# Patient Record
Sex: FEMALE | Race: WHITE | Employment: OTHER | URBAN - METROPOLITAN AREA
[De-identification: names, ages, dates, MRNs, and addresses within clinical notes are randomized per-mention and may not be internally consistent; named-entity substitution may affect disease eponyms.]

---

## 2018-03-20 ENCOUNTER — HOSPITAL ENCOUNTER (INPATIENT)
Age: 53
LOS: 3 days | Discharge: HOME | DRG: 247 | End: 2018-03-23
Attending: EMERGENCY MEDICINE | Admitting: INTERNAL MEDICINE
Payer: MEDICARE

## 2018-03-20 ENCOUNTER — APPOINTMENT (OUTPATIENT)
Dept: GENERAL RADIOLOGY | Age: 53
DRG: 247 | End: 2018-03-20
Attending: EMERGENCY MEDICINE
Payer: MEDICARE

## 2018-03-20 DIAGNOSIS — I48.91 ATRIAL FIBRILLATION WITH RVR (HCC): ICD-10-CM

## 2018-03-20 DIAGNOSIS — I21.4 NSTEMI (NON-ST ELEVATED MYOCARDIAL INFARCTION) (HCC): Primary | ICD-10-CM

## 2018-03-20 DIAGNOSIS — I42.1 HYPERTROPHIC OBSTRUCTIVE CARDIOMYOPATHY (HCC): ICD-10-CM

## 2018-03-20 PROBLEM — R07.9 CHEST PAIN AT REST: Status: ACTIVE | Noted: 2018-03-20

## 2018-03-20 PROBLEM — R77.8 ELEVATED TROPONIN: Status: ACTIVE | Noted: 2018-03-20

## 2018-03-20 PROBLEM — Z82.49 FAMILY HISTORY OF HYPERTROPHIC CARDIOMYOPATHY: Status: ACTIVE | Noted: 2018-03-20

## 2018-03-20 LAB
ALBUMIN SERPL-MCNC: 3.1 G/DL (ref 3.5–5)
ALBUMIN/GLOB SERPL: 0.7 {RATIO} (ref 1.2–3.5)
ALP SERPL-CCNC: 111 U/L (ref 50–136)
ALT SERPL-CCNC: 47 U/L (ref 12–65)
ANION GAP SERPL CALC-SCNC: 10 MMOL/L (ref 7–16)
APTT PPP: 26.3 SEC (ref 23.2–35.3)
AST SERPL-CCNC: 58 U/L (ref 15–37)
BASOPHILS # BLD: 0 K/UL (ref 0–0.2)
BASOPHILS NFR BLD: 0 % (ref 0–2)
BILIRUB SERPL-MCNC: 0.6 MG/DL (ref 0.2–1.1)
BNP SERPL-MCNC: 1085 PG/ML
BUN SERPL-MCNC: 19 MG/DL (ref 6–23)
CALCIUM SERPL-MCNC: 8.5 MG/DL (ref 8.3–10.4)
CHLORIDE SERPL-SCNC: 103 MMOL/L (ref 98–107)
CO2 SERPL-SCNC: 26 MMOL/L (ref 21–32)
CREAT SERPL-MCNC: 0.7 MG/DL (ref 0.6–1)
DIFFERENTIAL METHOD BLD: ABNORMAL
EOSINOPHIL # BLD: 0.4 K/UL (ref 0–0.8)
EOSINOPHIL NFR BLD: 3 % (ref 0.5–7.8)
ERYTHROCYTE [DISTWIDTH] IN BLOOD BY AUTOMATED COUNT: 14.9 % (ref 11.9–14.6)
GLOBULIN SER CALC-MCNC: 4.3 G/DL (ref 2.3–3.5)
GLUCOSE SERPL-MCNC: 112 MG/DL (ref 65–100)
HCT VFR BLD AUTO: 42.1 % (ref 35.8–46.3)
HGB BLD-MCNC: 13.4 G/DL (ref 11.7–15.4)
IMM GRANULOCYTES # BLD: 0 K/UL (ref 0–0.5)
IMM GRANULOCYTES NFR BLD AUTO: 0 % (ref 0–5)
LYMPHOCYTES # BLD: 2.6 K/UL (ref 0.5–4.6)
LYMPHOCYTES NFR BLD: 22 % (ref 13–44)
MCH RBC QN AUTO: 25.5 PG (ref 26.1–32.9)
MCHC RBC AUTO-ENTMCNC: 31.8 G/DL (ref 31.4–35)
MCV RBC AUTO: 80.2 FL (ref 79.6–97.8)
MONOCYTES # BLD: 0.9 K/UL (ref 0.1–1.3)
MONOCYTES NFR BLD: 8 % (ref 4–12)
NEUTS SEG # BLD: 7.8 K/UL (ref 1.7–8.2)
NEUTS SEG NFR BLD: 67 % (ref 43–78)
PLATELET # BLD AUTO: 316 K/UL (ref 150–450)
PMV BLD AUTO: 10.6 FL (ref 10.8–14.1)
POTASSIUM SERPL-SCNC: 3.3 MMOL/L (ref 3.5–5.1)
PROT SERPL-MCNC: 7.4 G/DL (ref 6.3–8.2)
RBC # BLD AUTO: 5.25 M/UL (ref 4.05–5.25)
SODIUM SERPL-SCNC: 139 MMOL/L (ref 136–145)
TROPONIN I SERPL-MCNC: 20.7 NG/ML (ref 0.02–0.05)
WBC # BLD AUTO: 11.8 K/UL (ref 4.3–11.1)

## 2018-03-20 PROCEDURE — 74011250636 HC RX REV CODE- 250/636: Performed by: INTERNAL MEDICINE

## 2018-03-20 PROCEDURE — 74011000250 HC RX REV CODE- 250: Performed by: INTERNAL MEDICINE

## 2018-03-20 PROCEDURE — 74011250636 HC RX REV CODE- 250/636: Performed by: EMERGENCY MEDICINE

## 2018-03-20 PROCEDURE — 99285 EMERGENCY DEPT VISIT HI MDM: CPT | Performed by: EMERGENCY MEDICINE

## 2018-03-20 PROCEDURE — 74011000258 HC RX REV CODE- 258: Performed by: EMERGENCY MEDICINE

## 2018-03-20 PROCEDURE — 93005 ELECTROCARDIOGRAM TRACING: CPT | Performed by: EMERGENCY MEDICINE

## 2018-03-20 PROCEDURE — 65660000000 HC RM CCU STEPDOWN

## 2018-03-20 PROCEDURE — 96375 TX/PRO/DX INJ NEW DRUG ADDON: CPT | Performed by: EMERGENCY MEDICINE

## 2018-03-20 PROCEDURE — 74011250637 HC RX REV CODE- 250/637: Performed by: NURSE PRACTITIONER

## 2018-03-20 PROCEDURE — 96374 THER/PROPH/DIAG INJ IV PUSH: CPT | Performed by: EMERGENCY MEDICINE

## 2018-03-20 PROCEDURE — 74011000250 HC RX REV CODE- 250: Performed by: EMERGENCY MEDICINE

## 2018-03-20 PROCEDURE — 84484 ASSAY OF TROPONIN QUANT: CPT | Performed by: EMERGENCY MEDICINE

## 2018-03-20 PROCEDURE — 96365 THER/PROPH/DIAG IV INF INIT: CPT | Performed by: EMERGENCY MEDICINE

## 2018-03-20 PROCEDURE — 74011250637 HC RX REV CODE- 250/637: Performed by: EMERGENCY MEDICINE

## 2018-03-20 PROCEDURE — 85025 COMPLETE CBC W/AUTO DIFF WBC: CPT | Performed by: EMERGENCY MEDICINE

## 2018-03-20 PROCEDURE — 80053 COMPREHEN METABOLIC PANEL: CPT | Performed by: EMERGENCY MEDICINE

## 2018-03-20 PROCEDURE — 85730 THROMBOPLASTIN TIME PARTIAL: CPT | Performed by: INTERNAL MEDICINE

## 2018-03-20 PROCEDURE — 83880 ASSAY OF NATRIURETIC PEPTIDE: CPT | Performed by: EMERGENCY MEDICINE

## 2018-03-20 PROCEDURE — 71045 X-RAY EXAM CHEST 1 VIEW: CPT

## 2018-03-20 RX ORDER — SODIUM CHLORIDE 0.9 % (FLUSH) 0.9 %
5-10 SYRINGE (ML) INJECTION AS NEEDED
Status: DISCONTINUED | OUTPATIENT
Start: 2018-03-20 | End: 2018-03-21

## 2018-03-20 RX ORDER — SODIUM CHLORIDE 0.9 % (FLUSH) 0.9 %
5-10 SYRINGE (ML) INJECTION EVERY 8 HOURS
Status: DISCONTINUED | OUTPATIENT
Start: 2018-03-20 | End: 2018-03-21

## 2018-03-20 RX ORDER — DILTIAZEM HYDROCHLORIDE 120 MG/1
120 CAPSULE, COATED, EXTENDED RELEASE ORAL
Status: COMPLETED | OUTPATIENT
Start: 2018-03-20 | End: 2018-03-20

## 2018-03-20 RX ORDER — DILTIAZEM HYDROCHLORIDE 5 MG/ML
10 INJECTION INTRAVENOUS ONCE
Status: COMPLETED | OUTPATIENT
Start: 2018-03-20 | End: 2018-03-20

## 2018-03-20 RX ORDER — POTASSIUM CHLORIDE 20 MEQ/1
40 TABLET, EXTENDED RELEASE ORAL
Status: COMPLETED | OUTPATIENT
Start: 2018-03-20 | End: 2018-03-20

## 2018-03-20 RX ORDER — HEPARIN SODIUM 5000 [USP'U]/ML
4000 INJECTION, SOLUTION INTRAVENOUS; SUBCUTANEOUS
Status: COMPLETED | OUTPATIENT
Start: 2018-03-20 | End: 2018-03-20

## 2018-03-20 RX ORDER — GUAIFENESIN 100 MG/5ML
162 LIQUID (ML) ORAL
Status: COMPLETED | OUTPATIENT
Start: 2018-03-20 | End: 2018-03-20

## 2018-03-20 RX ORDER — ONDANSETRON 2 MG/ML
4 INJECTION INTRAMUSCULAR; INTRAVENOUS
Status: COMPLETED | OUTPATIENT
Start: 2018-03-20 | End: 2018-03-20

## 2018-03-20 RX ORDER — HEPARIN SODIUM 5000 [USP'U]/100ML
12-25 INJECTION, SOLUTION INTRAVENOUS
Status: DISCONTINUED | OUTPATIENT
Start: 2018-03-20 | End: 2018-03-21

## 2018-03-20 RX ORDER — DILTIAZEM HYDROCHLORIDE 5 MG/ML
20 INJECTION INTRAVENOUS
Status: COMPLETED | OUTPATIENT
Start: 2018-03-20 | End: 2018-03-20

## 2018-03-20 RX ORDER — MORPHINE SULFATE 2 MG/ML
4 INJECTION, SOLUTION INTRAMUSCULAR; INTRAVENOUS
Status: COMPLETED | OUTPATIENT
Start: 2018-03-20 | End: 2018-03-20

## 2018-03-20 RX ORDER — DILTIAZEM HYDROCHLORIDE 5 MG/ML
15 INJECTION INTRAVENOUS
Status: DISCONTINUED | OUTPATIENT
Start: 2018-03-20 | End: 2018-03-20

## 2018-03-20 RX ADMIN — HEPARIN SODIUM 4000 UNITS: 5000 INJECTION, SOLUTION INTRAVENOUS; SUBCUTANEOUS at 21:15

## 2018-03-20 RX ADMIN — MORPHINE SULFATE 4 MG: 2 INJECTION, SOLUTION INTRAMUSCULAR; INTRAVENOUS at 22:09

## 2018-03-20 RX ADMIN — DILTIAZEM HYDROCHLORIDE 120 MG: 120 CAPSULE, COATED, EXTENDED RELEASE ORAL at 20:36

## 2018-03-20 RX ADMIN — DILTIAZEM HYDROCHLORIDE 20 MG: 5 INJECTION INTRAVENOUS at 20:31

## 2018-03-20 RX ADMIN — Medication 5 ML: at 22:39

## 2018-03-20 RX ADMIN — ASPIRIN 81 MG 162 MG: 81 TABLET ORAL at 20:24

## 2018-03-20 RX ADMIN — ASPIRIN 81 MG 162 MG: 81 TABLET ORAL at 21:03

## 2018-03-20 RX ADMIN — SODIUM CHLORIDE 1000 ML: 900 INJECTION, SOLUTION INTRAVENOUS at 21:03

## 2018-03-20 RX ADMIN — SODIUM CHLORIDE 10 MG/HR: 900 INJECTION, SOLUTION INTRAVENOUS at 20:59

## 2018-03-20 RX ADMIN — ONDANSETRON 4 MG: 2 INJECTION INTRAMUSCULAR; INTRAVENOUS at 22:09

## 2018-03-20 RX ADMIN — POTASSIUM CHLORIDE 40 MEQ: 20 TABLET, EXTENDED RELEASE ORAL at 21:53

## 2018-03-20 RX ADMIN — DILTIAZEM HYDROCHLORIDE 10 MG: 5 INJECTION INTRAVENOUS at 20:57

## 2018-03-20 RX ADMIN — HEPARIN SODIUM AND DEXTROSE 12 UNITS/KG/HR: 5000; 5 INJECTION INTRAVENOUS at 22:36

## 2018-03-20 NOTE — ED TRIAGE NOTES
Life style changes and living arrangement over last 1.5 yrs. Admits to not taking diuretic for at least 4 days. Currently having shortness of breath that is worse with exertion. States \"not a whole lot of swelling in extremities but swelling in face and not able to lie down.

## 2018-03-20 NOTE — IP AVS SNAPSHOT
[address] 
[phone] Patient: Amanda Quinones MRN: [id number] :1965 A check miranda indicates which time of day the medication should be taken. My Medications START taking these medications Instructions Each Dose to Equal  
 Morning Noon Evening Bedtime  
 albuterol 90 mcg/actuation inhaler Commonly known as:  PROVENTIL HFA, VENTOLIN HFA, PROAIR HFA Notes to Patient:  As needed Take 2 Puffs by inhalation every six (6) hours as needed for Wheezing. 2 Puff  
    
   
   
   
  
 atorvastatin 80 mg tablet Commonly known as:  LIPITOR Your next dose is:  3-23-18 Take 1 Tab by mouth nightly. 80 mg  
    
   
   
  
   
  
 busPIRone 10 mg tablet Commonly known as:  BUSPAR Your next dose is:  3-23-18 Take 1 Tab by mouth three (3) times daily (with meals). 10 mg  
    
  
   
  
   
  
   
  
 clopidogrel 75 mg Tab Commonly known as:  PLAVIX Your next dose is:  3-24-18 Take 1 Tab by mouth daily. 75 mg  
    
  
   
   
   
  
 metoprolol succinate 50 mg XL tablet Commonly known as:  TOPROL-XL Your next dose is:  3-24-18 Take 1 Tab by mouth daily. 50 mg  
    
  
   
   
   
  
 nitroglycerin 0.4 mg SL tablet Commonly known as:  NITROSTAT Notes to Patient:  As needed for chest pain 1 Tab by SubLINGual route every five (5) minutes as needed for Chest Pain. 0.4 mg  
    
   
   
   
  
 rivaroxaban 15 mg Tab tablet Commonly known as:  Erla Brothers Your next dose is:  3-23-18 Take 1 Tab by mouth daily (with dinner). 15 mg  
    
   
   
  
   
  
  
STOP taking these medications LASIX 20 mg tablet Generic drug:  furosemide Where to Get Your Medications These medications were sent to [facility], [address] [place] [address] [place] [address] Phone:  [phone]  
  albuterol 90 mcg/actuation inhaler  
 atorvastatin 80 mg tablet  
 busPIRone 10 mg tablet  
 clopidogrel 75 mg Tab  
 metoprolol succinate 50 mg XL tablet  
 nitroglycerin 0.4 mg SL tablet  
 rivaroxaban 15 mg Tab tablet

## 2018-03-20 NOTE — IP AVS SNAPSHOT
[address] [facility] 
 
 
 [address] 
[phone] Patient: Ashley Duncan MRN: [id number] :1965 About your hospitalization You were admitted on:  2018 You last received care in the:  [facility] 3 TELEMETRY You were discharged on:  2018 Why you were hospitalized Your primary diagnosis was:  Nstemi (Non-St Elevated Myocardial Infarction) (Hcc) Your diagnoses also included:  Hypertrophic Obstructive Cardiomyopathy (Hcc), Copd (Chronic Obstructive Pulmonary Disease) (Hcc), Tobacco Use Disorder, Atrial Fibrillation With Rapid Ventricular Response (Hcc), Coronary Artery Disease Involving Native Coronary Artery Of Native Heart, Essential Hypertension, Dyslipidemia Follow-up Information Follow up With Details Comments Contact Info O CARDIOPULM REHAB  Call if you would like to schedule Orientation for Cardiac Rehab. [phone] 
[phone] Red Ramos MD   [address] [facility] [address] 
[phone] Trinidad Mccauley MD   at [address] [facility] [address]  [address] [facility] [address] 
[phone] Your Scheduled Appointments   4:30 PM EDT TRANSITIONAL CARE with Trinidad Mccauley MD  
[facility] ([address] [place] [address]) [address]  
[address] [facility] 81  
[phone] Discharge Orders Procedure Order Date Status Priority Quantity Spec Type Associated Dx REFERRAL TO CARDIAC [facility] ([facility] 18 0856 Normal Routine 1 A check miranda indicates which time of day the medication should be taken. My Medications START taking these medications Instructions Each Dose to Equal  
 Morning Noon Evening Bedtime  
 albuterol 90 mcg/actuation inhaler Commonly known as:  PROVENTIL HFA, VENTOLIN HFA, PROAIR HFA  
 Notes to Patient:  As needed Take 2 Puffs by inhalation every six (6) hours as needed for Wheezing. 2 Puff  
    
   
   
   
  
 atorvastatin 80 mg tablet Commonly known as:  LIPITOR Your next dose is:  3-23-18 Take 1 Tab by mouth nightly. 80 mg  
    
   
   
  
   
  
 busPIRone 10 mg tablet Commonly known as:  BUSPAR Your next dose is:  3-23-18 Take 1 Tab by mouth three (3) times daily (with meals). 10 mg  
    
  
   
  
   
  
   
  
 clopidogrel 75 mg Tab Commonly known as:  PLAVIX Your next dose is:  3-24-18 Take 1 Tab by mouth daily. 75 mg  
    
  
   
   
   
  
 metoprolol succinate 50 mg XL tablet Commonly known as:  TOPROL-XL Your next dose is:  3-24-18 Take 1 Tab by mouth daily. 50 mg  
    
  
   
   
   
  
 nitroglycerin 0.4 mg SL tablet Commonly known as:  NITROSTAT Notes to Patient:  As needed for chest pain 1 Tab by SubLINGual route every five (5) minutes as needed for Chest Pain. 0.4 mg  
    
   
   
   
  
 rivaroxaban 15 mg Tab tablet Commonly known as:  Thanh Tiffany Your next dose is:  3-23-18 Take 1 Tab by mouth daily (with dinner). 15 mg  
    
   
   
  
   
  
  
STOP taking these medications LASIX 20 mg tablet Generic drug:  furosemide Where to Get Your Medications These medications were sent to [address] [place] [address] Phone:  [phone]  
  albuterol 90 mcg/actuation inhaler  
 atorvastatin 80 mg tablet  
 busPIRone 10 mg tablet  
 clopidogrel 75 mg Tab  
 metoprolol succinate 50 mg XL tablet  
 nitroglycerin 0.4 mg SL tablet  
 rivaroxaban 15 mg Tab tablet Discharge Instructions Cardiac Catheterization/Angiography Discharge Instructions *Check the puncture site frequently for swelling or bleeding.  If you see any bleeding, lie down and apply pressure over the area with a clean town or washcloth. Notify your doctor for any redness, swelling, drainage or oozing from the puncture site. Notify your doctor for any fever or chills. *If the leg or arm with the puncture becomes cold, numb or painful, call [facility] Cardiology at  [phone] *Activity should be limited for the next 48 hours. Climb stairs as little as possible and avoid any stooping, bending or strenuous activity for 48 hours. No heavy lifting (anything over 10 pounds) for three days. *Do not drive for 48 hours. *You may resume your usual diet. Drink more fluids than usual. 
 
*Have a responsible person drive you home and stay with you for at least 24 hours after your heart catheterization/angiography. *You may remove the bandage from your Right and Arm in 24 hours. You may shower in 24 hours. No tub baths, hot tubs or swimming for one week. Do not place any lotions, creams, powders, ointments over the puncture site for one week. You may place a clean band-aid over the puncture site each day for 5 days. Change this daily. Percutaneous Coronary Intervention: What to Expect at [facility] Your Recovery Percutaneous coronary intervention (PCI) is the name for procedures that are used to open a narrowed or blocked coronary artery. The two most common PCI procedures are coronary angioplasty and coronary stent placement. Your groin or arm may have a bruise and feel sore for a day or two after a percutaneous coronary intervention (PCI). You can do light activities around the house, but nothing strenuous for several days. This care sheet gives you a general idea about how long it will take for you to recover. But each person recovers at a different pace. Follow the steps below to get better as quickly as possible. How can you care for yourself at home? Activity ? · If the doctor gave you a sedative: ¨ For 24 hours, don't do anything that requires attention to detail. It takes time for the medicine's effects to completely wear off. ¨ For your safety, do not drive or operate any machinery that could be dangerous. Wait until the medicine wears off and you can think clearly and react easily. ? · Do not do strenuous exercise and do not lift, pull, or push anything heavy until your doctor says it is okay. This may be for a day or two. You can walk around the house and do light activity, such as cooking. ? · If the catheter was placed in your groin, try not to walk up stairs for the first couple of days. ? · If the catheter was placed in your arm near your wrist, do not bend your wrist deeply for the first couple of days. Be careful using your hand to get into and out of a chair or bed. ? · Carry your stent identification card with you at all times. ? · If your doctor recommends it, get more exercise. Walking is a good choice. Bit by bit, increase the amount you walk every day. Try for at least 30 minutes on most days of the week. Diet ? · Drink plenty of fluids to help your body flush out the dye. If you have kidney, heart, or liver disease and have to limit fluids, talk with your doctor before you increase the amount of fluids you drink. ? · Keep eating a heart-healthy diet that has lots of fruits, vegetables, and whole grains. If you have not been eating this way, talk to your doctor. You also may want to talk to a dietitian. This expert can help you to learn about healthy foods and plan meals. Medicines ? · Your doctor will tell you if and when you can restart your medicines. He or she will also give you instructions about taking any new medicines. ? · If you take blood thinners, such as warfarin (Coumadin), clopidogrel (Plavix), or aspirin, be sure to talk to your doctor. He or she will tell you if and when to start taking those medicines again.  Make sure that you understand exactly what your doctor wants you to do.  
? · Your doctor will prescribe blood-thinning medicines. You will likely take aspirin plus another antiplatelet, such as clopidogrel (Plavix). It is very important that you take these medicines exactly as directed. These medicines help keep the coronary artery open and reduce your risk of a heart attack. ? · Call your doctor if you think you are having a problem with your medicine. ?Care of the catheter site ? · For 1 or 2 days, keep a bandage over the spot where the catheter was inserted. The bandage probably will fall off in this time. ? · Put ice or a cold pack on the area for 10 to 20 minutes at a time to help with soreness or swelling. Put a thin cloth between the ice and your skin. ? · You may shower 24 to 48 hours after the procedure, if your doctor okays it. Pat the incision dry. ? · Do not soak the catheter site until it is healed. Don't take a bath for 1 week, or until your doctor tells you it isokay. Follow-up care is a key part of your treatment and safety. Be sure to make and go to all appointments, and call your doctor if you are having problems. It's also a good idea to know your test results and keep a list of the medicines you take. When should you call for help? Call 911 anytime you think you may need emergency care. For example, call if: 
? · You passed out (lost consciousness). ? · You have severe trouble breathing. ? · You have sudden chest pain and shortness of breath, or you cough up blood. ? · You have symptoms of a heart attack, such as: ¨ Chest pain or pressure. ¨ Sweating. ¨ Shortness of breath. ¨ Nausea or vomiting. ¨ Pain that spreads from the chest to the neck, jaw, or one or both shoulders or arms. ¨ Dizziness or lightheadedness. ¨ A fast or uneven pulse. After calling 911, chew 1 adult-strength aspirin. Wait for an ambulance. Do not try to drive yourself. ? · You have been diagnosed with angina, and you have angina symptoms that do not go away with rest or are not getting better within 5 minutes after you take one dose of nitroglycerin. ?Call your doctor now or seek immediate medical care if: 
? · You are bleeding from the area where the catheter was put in your artery. ? · You have a fast-growing, painful lump at the catheter site. ? · You have signs of infection, such as: 
¨ Increased pain, swelling, warmth, or redness. ¨ Red streaks leading from the catheter site. ¨ Pus draining from the catheter site. ¨ A fever. ? · Your leg or arm looks blue or feels cold, numb, or tingly. ? Watch closely for changes in your health, and be sure to contact your doctor if you have any problems. Where can you learn more? Go to http://juanita-mj.info/. Enter K328 in the search box to learn more about \"Percutaneous Coronary Intervention: What to Expect at Home. \" Current as of: September 21, 2016 Content Version: 11.4 © 0781-2491 [employer]. Care instructions adapted under license by [employer] (which disclaims liability or warranty for this information). If you have questions about a medical condition or this instruction, always ask your healthcare professional. [address] any warranty or liability for your use of this information. Zecter Announcement We are excited to announce that we are making your provider's discharge notes available to you in Zecter. You will see these notes when they are completed and signed by the physician that discharged you from your recent hospital stay. If you have any questions or concerns about any information you see in Zecter, please call the Health Information Department where you were seen or reach out to your Primary Care Provider for more information about your plan of care. Introducing [facility]! Loren Santiago introduces IVFXPERT patient portal. Now you can access parts of your medical record, email your doctor's office, and request medication refills online. 1. In your internet browser, go to [email or web address] Media 2. Click on the First Time User? Click Here link in the Sign In box. You will see the New Member Sign Up page. 3. Enter your IVFXPERT Access Code exactly as it appears below. You will not need to use this code after youve completed the sign-up process. If you do not sign up before the expiration date, you must request a new code. · IVFXPERT Access Code: [email or web address] Expires: 6/18/2018  6:08 PM 
 
4. Enter the last four digits of your Social Security Number (xxxx) and Date of Birth (mm/dd/yyyy) as indicated and click Submit. You will be taken to the next sign-up page. 5. Create a IVFXPERT ID. This will be your IVFXPERT login ID and cannot be changed, so think of one that is secure and easy to remember. 6. Create a IVFXPERT password. You can change your password at any time. 7. Enter your Password Reset Question and Answer. This can be used at a later time if you forget your password. 8. Enter your e-mail address. You will receive e-mail notification when new information is available in [address] 9. Click Sign Up. You can now view and download portions of your medical record. 10. Click the Download Summary menu link to download a portable copy of your medical information. If you have questions, please visit the Frequently Asked Questions section of the IVFXPERT website. Remember, IVFXPERT is NOT to be used for urgent needs. For medical emergencies, dial 911. Now available from your iPhone and Android! Providers Seen During Your Hospitalization Provider Specialty Primary office phone Todd Carranza MD Emergency Medicine [phone] Tesfaye Danielle MD Cardiology [phone] Your Primary Care Physician (PCP) Primary Care Physician Office Phone Office Fax [address] [facility], [address] [phone] You are allergic to the following Allergen Reactions Sulfa (Sulfonamide Antibiotics) Itching Recent Documentation Height Weight BMI OB Status Smoking Status 1.575 m 70.6 kg 28.48 kg/m2 Having regular periods Current Every Day Smoker Emergency Contacts Name Discharge Info Relation Home Work Mobile Angie Darnell  Other Relative [6] [phone] Patient Belongings The following personal items are in your possession at time of discharge: 
  Dental Appliances: Uppers, With patient  Visual Aid: Glasses, At bedside      Home Medications: None   Jewelry: Ring  Clothing: At bedside    Other Valuables: Cell Phone Discharge Instructions Attachments/References CARDIAC REHABILITATION (ENGLISH) Patient Handouts Cardiac Rehabilitation: Care Instructions Your Care Instructions Cardiac rehabilitation is a program for people who have a heart problem, such as a heart attack, heart failure, or a heart valve disease. The program includes exercise, lifestyle changes, education, and emotional support. Cardiac rehab can help you improve the quality of your life through better overall health. It can help you lose weight and feel better about yourself. On your cardiac rehab team, you may have your doctor, a nurse specialist, a dietitian, and a physical therapist. They will design your cardiac rehab program specifically for you. You will learn how to reduce your risk for heart problems, how to manage stress, and how to eat a heart-healthy diet. By the end of the program, you will be ready to maintain a healthier lifestyle on your own. Follow-up care is a key part of your treatment and safety. Be sure to make and go to all appointments, and call your doctor if you are having problems.  It's also a good idea to know your test results and keep a list of the medicines you take. How can you care for yourself at home? · Take your medicines exactly as prescribed. Call your doctor if you think you are having a problem with your medicine. You will get more details on the specific medicines your doctor prescribes. · Weigh yourself every day if your doctor tells you to. Watch for sudden weight gain. Weigh yourself on the same scale with the same amount of clothing at the same time of day. · Plan your meals so that you are eating heart-healthy foods. ¨ Eat a variety of foods daily. Fresh fruits and vegetables and whole-grains are good choices. ¨ Limit your fat intake, especially saturated and trans fat. ¨ Limit salt (sodium). ¨ Increase fiber in your diet. ¨ Limit alcohol. · Learn how to take your pulse so that you can track your heart rate during exercise. · Always check with your doctor before you begin a new exercise program. 
· Warm up before you exercise and cool down afterward for at least 15 minutes each. This will help your heart gradually prepare for and recover from exercise and avoid pushing your heart too hard. · Stop exercising if you have any unusual discomfort, such as chest pain. · Do not smoke. Smoking can make heart problems worse. If you need help quitting, talk to your doctor about stop-smoking programs and medicines. These can increase your chances of quitting for good. When should you call for help? Call 911 anytime you think you may need emergency care. For example, call if: 
? · You have severe trouble breathing. ? · You cough up pink, foamy mucus and you have trouble breathing. ? · You have symptoms of a heart attack. These may include: ¨ Chest pain or pressure, or a strange feeling in the chest. 
¨ Sweating. ¨ Shortness of breath. ¨ Nausea or vomiting. ¨ Pain, pressure, or a strange feeling in the back, neck, jaw, or upper belly or in one or both shoulders or arms. ¨ Lightheadedness or sudden weakness. ¨ A fast or irregular heartbeat. After you call 911, the  may tell you to chew 1 adult-strength or 2 to 4 low-dose aspirin. Wait for an ambulance. Do not try to drive yourself. ? · You have angina symptoms (such as chest pain or pressure) that do not go away with rest or are not getting better within 5 minutes after you take a dose of nitroglycerin. ? · You have symptoms of a stroke. These may include: 
¨ Sudden numbness, tingling, weakness, or loss of movement in your face, arm, or leg, especially on only one side of your body. ¨ Sudden vision changes. ¨ Sudden trouble speaking. ¨ Sudden confusion or trouble understanding simple statements. ¨ Sudden problems with walking or balance. ¨ A sudden, severe headache that is different from past headaches. ? · You passed out (lost consciousness). ?Call your doctor now or seek immediate medical care if: 
? · You have new or increased shortness of breath. ? · You are dizzy or lightheaded, or you feel like you may faint. ? · You gain weight suddenly, such as more than 2 to 3 pounds in a day or 5 pounds in a week. (Your doctor may suggest a different range of weight gain.) ? · You have increased swelling in your legs, ankles, or feet. ? Watch closely for changes in your health, and be sure to contact your doctor if you have any problems. Where can you learn more? Go to http://juanita-mj.info/. Enter D336 in the search box to learn more about \"Cardiac Rehabilitation: Care Instructions. \" Current as of: September 21, 2016 Content Version: 11.4 © 5717-9699 [employer]. Care instructions adapted under license by [employer] (which disclaims liability or warranty for this information). If you have questions about a medical condition or this instruction, always ask your healthcare professional. [address] any warranty or liability for your use of this information. Please provide this summary of care documentation to your next provider. Signatures-by signing, you are acknowledging that this After Visit Summary has been reviewed with you and you have received a copy. Patient Signature:  ____________________________________________________________ Date:  ____________________________________________________________  
  
Yoli Miguel Angel Provider Signature:  ____________________________________________________________ Date:  ____________________________________________________________

## 2018-03-21 PROBLEM — I25.10 CORONARY ARTERY DISEASE INVOLVING NATIVE CORONARY ARTERY OF NATIVE HEART: Chronic | Status: ACTIVE | Noted: 2018-03-21

## 2018-03-21 PROBLEM — I10 ESSENTIAL HYPERTENSION: Chronic | Status: ACTIVE | Noted: 2018-03-21

## 2018-03-21 PROBLEM — E78.5 DYSLIPIDEMIA: Chronic | Status: ACTIVE | Noted: 2018-03-21

## 2018-03-21 PROBLEM — I21.4 NSTEMI (NON-ST ELEVATED MYOCARDIAL INFARCTION) (HCC): Status: ACTIVE | Noted: 2018-03-21

## 2018-03-21 LAB
ACT BLD: 153 SECS (ref 70–128)
ACT BLD: 406 SECS (ref 70–128)
ANION GAP SERPL CALC-SCNC: 16 MMOL/L (ref 7–16)
APTT PPP: 41.1 SEC (ref 23.2–35.3)
ATRIAL RATE: 170 BPM
ATRIAL RATE: 50 BPM
BUN SERPL-MCNC: 19 MG/DL (ref 6–23)
CALCIUM SERPL-MCNC: 8.1 MG/DL (ref 8.3–10.4)
CALCULATED P AXIS, ECG09: 12 DEGREES
CALCULATED R AXIS, ECG10: -52 DEGREES
CALCULATED R AXIS, ECG10: 62 DEGREES
CALCULATED T AXIS, ECG11: 108 DEGREES
CALCULATED T AXIS, ECG11: 77 DEGREES
CHLORIDE SERPL-SCNC: 105 MMOL/L (ref 98–107)
CHOLEST SERPL-MCNC: 105 MG/DL
CO2 SERPL-SCNC: 18 MMOL/L (ref 21–32)
CREAT SERPL-MCNC: 0.91 MG/DL (ref 0.6–1)
DIAGNOSIS, 93000: NORMAL
DIAGNOSIS, 93000: NORMAL
ERYTHROCYTE [DISTWIDTH] IN BLOOD BY AUTOMATED COUNT: 15.3 % (ref 11.9–14.6)
GLUCOSE SERPL-MCNC: 150 MG/DL (ref 65–100)
HCT VFR BLD AUTO: 45 % (ref 35.8–46.3)
HDLC SERPL-MCNC: 20 MG/DL (ref 40–60)
HDLC SERPL: 5.3 {RATIO}
HGB BLD-MCNC: 13.7 G/DL (ref 11.7–15.4)
LDLC SERPL CALC-MCNC: 66.8 MG/DL
LIPID PROFILE,FLP: ABNORMAL
MAGNESIUM SERPL-MCNC: 1.7 MG/DL (ref 1.8–2.4)
MCH RBC QN AUTO: 25 PG (ref 26.1–32.9)
MCHC RBC AUTO-ENTMCNC: 30.4 G/DL (ref 31.4–35)
MCV RBC AUTO: 82.3 FL (ref 79.6–97.8)
P-R INTERVAL, ECG05: 174 MS
PLATELET # BLD AUTO: 330 K/UL (ref 150–450)
PMV BLD AUTO: 10.9 FL (ref 10.8–14.1)
POTASSIUM SERPL-SCNC: 4.1 MMOL/L (ref 3.5–5.1)
Q-T INTERVAL, ECG07: 340 MS
Q-T INTERVAL, ECG07: 504 MS
QRS DURATION, ECG06: 130 MS
QRS DURATION, ECG06: 134 MS
QTC CALCULATION (BEZET), ECG08: 459 MS
QTC CALCULATION (BEZET), ECG08: 554 MS
RBC # BLD AUTO: 5.47 M/UL (ref 4.05–5.25)
SODIUM SERPL-SCNC: 139 MMOL/L (ref 136–145)
TRIGL SERPL-MCNC: 91 MG/DL (ref 35–150)
TROPONIN I SERPL-MCNC: 17.8 NG/ML (ref 0.02–0.05)
TROPONIN I SERPL-MCNC: 18.1 NG/ML (ref 0.02–0.05)
VENTRICULAR RATE, ECG03: 160 BPM
VENTRICULAR RATE, ECG03: 50 BPM
VLDLC SERPL CALC-MCNC: 18.2 MG/DL (ref 6–23)
WBC # BLD AUTO: 11.3 K/UL (ref 4.3–11.1)

## 2018-03-21 PROCEDURE — 77030015766

## 2018-03-21 PROCEDURE — C1887 CATHETER, GUIDING: HCPCS

## 2018-03-21 PROCEDURE — 74011250637 HC RX REV CODE- 250/637: Performed by: NURSE PRACTITIONER

## 2018-03-21 PROCEDURE — 77030019569 HC BND COMPR RAD TERU -B

## 2018-03-21 PROCEDURE — 85347 COAGULATION TIME ACTIVATED: CPT

## 2018-03-21 PROCEDURE — 80048 BASIC METABOLIC PNL TOTAL CA: CPT | Performed by: NURSE PRACTITIONER

## 2018-03-21 PROCEDURE — 74011250636 HC RX REV CODE- 250/636

## 2018-03-21 PROCEDURE — 74011000258 HC RX REV CODE- 258: Performed by: NURSE PRACTITIONER

## 2018-03-21 PROCEDURE — 74011250637 HC RX REV CODE- 250/637: Performed by: INTERNAL MEDICINE

## 2018-03-21 PROCEDURE — C1894 INTRO/SHEATH, NON-LASER: HCPCS

## 2018-03-21 PROCEDURE — 74011636320 HC RX REV CODE- 636/320: Performed by: INTERNAL MEDICINE

## 2018-03-21 PROCEDURE — 36415 COLL VENOUS BLD VENIPUNCTURE: CPT | Performed by: NURSE PRACTITIONER

## 2018-03-21 PROCEDURE — 027034Z DILATION OF CORONARY ARTERY, ONE ARTERY WITH DRUG-ELUTING INTRALUMINAL DEVICE, PERCUTANEOUS APPROACH: ICD-10-PCS | Performed by: INTERNAL MEDICINE

## 2018-03-21 PROCEDURE — 99152 MOD SED SAME PHYS/QHP 5/>YRS: CPT

## 2018-03-21 PROCEDURE — 74011250636 HC RX REV CODE- 250/636: Performed by: INTERNAL MEDICINE

## 2018-03-21 PROCEDURE — B2111ZZ FLUOROSCOPY OF MULTIPLE CORONARY ARTERIES USING LOW OSMOLAR CONTRAST: ICD-10-PCS | Performed by: INTERNAL MEDICINE

## 2018-03-21 PROCEDURE — C1874 STENT, COATED/COV W/DEL SYS: HCPCS

## 2018-03-21 PROCEDURE — C1769 GUIDE WIRE: HCPCS

## 2018-03-21 PROCEDURE — 83735 ASSAY OF MAGNESIUM: CPT | Performed by: INTERNAL MEDICINE

## 2018-03-21 PROCEDURE — 65660000000 HC RM CCU STEPDOWN

## 2018-03-21 PROCEDURE — 92928 PRQ TCAT PLMT NTRAC ST 1 LES: CPT

## 2018-03-21 PROCEDURE — B2151ZZ FLUOROSCOPY OF LEFT HEART USING LOW OSMOLAR CONTRAST: ICD-10-PCS | Performed by: INTERNAL MEDICINE

## 2018-03-21 PROCEDURE — 74011000250 HC RX REV CODE- 250: Performed by: INTERNAL MEDICINE

## 2018-03-21 PROCEDURE — 74011000250 HC RX REV CODE- 250: Performed by: NURSE PRACTITIONER

## 2018-03-21 PROCEDURE — 85027 COMPLETE CBC AUTOMATED: CPT | Performed by: NURSE PRACTITIONER

## 2018-03-21 PROCEDURE — 74011250636 HC RX REV CODE- 250/636: Performed by: NURSE PRACTITIONER

## 2018-03-21 PROCEDURE — 84484 ASSAY OF TROPONIN QUANT: CPT | Performed by: NURSE PRACTITIONER

## 2018-03-21 PROCEDURE — 80307 DRUG TEST PRSMV CHEM ANLYZR: CPT | Performed by: NURSE PRACTITIONER

## 2018-03-21 PROCEDURE — C8929 TTE W OR WO FOL WCON,DOPPLER: HCPCS

## 2018-03-21 PROCEDURE — 99153 MOD SED SAME PHYS/QHP EA: CPT

## 2018-03-21 PROCEDURE — 93005 ELECTROCARDIOGRAM TRACING: CPT | Performed by: INTERNAL MEDICINE

## 2018-03-21 PROCEDURE — 85730 THROMBOPLASTIN TIME PARTIAL: CPT | Performed by: INTERNAL MEDICINE

## 2018-03-21 PROCEDURE — 77030004534 HC CATH ANGI DX INFN CARD -A

## 2018-03-21 PROCEDURE — 80061 LIPID PANEL: CPT | Performed by: NURSE PRACTITIONER

## 2018-03-21 PROCEDURE — 4A023N7 MEASUREMENT OF CARDIAC SAMPLING AND PRESSURE, LEFT HEART, PERCUTANEOUS APPROACH: ICD-10-PCS | Performed by: INTERNAL MEDICINE

## 2018-03-21 PROCEDURE — 93458 L HRT ARTERY/VENTRICLE ANGIO: CPT

## 2018-03-21 RX ORDER — SODIUM CHLORIDE 0.9 % (FLUSH) 0.9 %
5-10 SYRINGE (ML) INJECTION AS NEEDED
Status: DISCONTINUED | OUTPATIENT
Start: 2018-03-21 | End: 2018-03-23 | Stop reason: HOSPADM

## 2018-03-21 RX ORDER — ATORVASTATIN CALCIUM 40 MG/1
80 TABLET, FILM COATED ORAL
Status: DISCONTINUED | OUTPATIENT
Start: 2018-03-21 | End: 2018-03-23 | Stop reason: HOSPADM

## 2018-03-21 RX ORDER — MORPHINE SULFATE 2 MG/ML
2 INJECTION, SOLUTION INTRAMUSCULAR; INTRAVENOUS
Status: DISCONTINUED | OUTPATIENT
Start: 2018-03-21 | End: 2018-03-21

## 2018-03-21 RX ORDER — FUROSEMIDE 20 MG/1
20 TABLET ORAL DAILY
COMMUNITY
End: 2018-03-23

## 2018-03-21 RX ORDER — ACETAMINOPHEN 325 MG/1
650 TABLET ORAL
Status: DISCONTINUED | OUTPATIENT
Start: 2018-03-21 | End: 2018-03-21 | Stop reason: SDUPTHER

## 2018-03-21 RX ORDER — SOTALOL HYDROCHLORIDE 80 MG/1
120 TABLET ORAL EVERY 12 HOURS
Status: DISCONTINUED | OUTPATIENT
Start: 2018-03-21 | End: 2018-03-21

## 2018-03-21 RX ORDER — ONDANSETRON 2 MG/ML
4 INJECTION INTRAMUSCULAR; INTRAVENOUS
Status: DISCONTINUED | OUTPATIENT
Start: 2018-03-21 | End: 2018-03-23 | Stop reason: HOSPADM

## 2018-03-21 RX ORDER — SODIUM CHLORIDE 0.9 % (FLUSH) 0.9 %
5-10 SYRINGE (ML) INJECTION EVERY 8 HOURS
Status: DISCONTINUED | OUTPATIENT
Start: 2018-03-21 | End: 2018-03-21

## 2018-03-21 RX ORDER — MIDAZOLAM HYDROCHLORIDE 1 MG/ML
.5-2 INJECTION, SOLUTION INTRAMUSCULAR; INTRAVENOUS
Status: DISCONTINUED | OUTPATIENT
Start: 2018-03-21 | End: 2018-03-21

## 2018-03-21 RX ORDER — SODIUM CHLORIDE 0.9 % (FLUSH) 0.9 %
5-10 SYRINGE (ML) INJECTION EVERY 8 HOURS
Status: DISCONTINUED | OUTPATIENT
Start: 2018-03-21 | End: 2018-03-23 | Stop reason: HOSPADM

## 2018-03-21 RX ORDER — HEPARIN SODIUM 5000 [USP'U]/ML
35 INJECTION, SOLUTION INTRAVENOUS; SUBCUTANEOUS ONCE
Status: COMPLETED | OUTPATIENT
Start: 2018-03-21 | End: 2018-03-21

## 2018-03-21 RX ORDER — ACETAMINOPHEN 325 MG/1
650 TABLET ORAL
Status: DISCONTINUED | OUTPATIENT
Start: 2018-03-21 | End: 2018-03-23 | Stop reason: HOSPADM

## 2018-03-21 RX ORDER — SODIUM CHLORIDE 0.9 % (FLUSH) 0.9 %
5-10 SYRINGE (ML) INJECTION AS NEEDED
Status: DISCONTINUED | OUTPATIENT
Start: 2018-03-21 | End: 2018-03-21

## 2018-03-21 RX ORDER — HYDROCODONE BITARTRATE AND ACETAMINOPHEN 5; 325 MG/1; MG/1
1 TABLET ORAL
Status: DISCONTINUED | OUTPATIENT
Start: 2018-03-21 | End: 2018-03-21 | Stop reason: SDUPTHER

## 2018-03-21 RX ORDER — LORAZEPAM 1 MG/1
1 TABLET ORAL
Status: DISCONTINUED | OUTPATIENT
Start: 2018-03-21 | End: 2018-03-22

## 2018-03-21 RX ORDER — GUAIFENESIN 100 MG/5ML
81 LIQUID (ML) ORAL DAILY
Status: DISCONTINUED | OUTPATIENT
Start: 2018-03-21 | End: 2018-03-21 | Stop reason: SDUPTHER

## 2018-03-21 RX ORDER — LIDOCAINE HYDROCHLORIDE 20 MG/ML
2-10 INJECTION, SOLUTION INFILTRATION; PERINEURAL
Status: DISCONTINUED | OUTPATIENT
Start: 2018-03-21 | End: 2018-03-21

## 2018-03-21 RX ORDER — SOTALOL HYDROCHLORIDE 80 MG/1
120 TABLET ORAL EVERY 12 HOURS
Status: DISCONTINUED | OUTPATIENT
Start: 2018-03-21 | End: 2018-03-22

## 2018-03-21 RX ORDER — GUAIFENESIN 100 MG/5ML
81 LIQUID (ML) ORAL DAILY
Status: DISCONTINUED | OUTPATIENT
Start: 2018-03-22 | End: 2018-03-23

## 2018-03-21 RX ORDER — LORAZEPAM 0.5 MG/1
0.5 TABLET ORAL
Status: DISCONTINUED | OUTPATIENT
Start: 2018-03-21 | End: 2018-03-23 | Stop reason: HOSPADM

## 2018-03-21 RX ORDER — HYDROCODONE BITARTRATE AND ACETAMINOPHEN 5; 325 MG/1; MG/1
1 TABLET ORAL
Status: DISCONTINUED | OUTPATIENT
Start: 2018-03-21 | End: 2018-03-23 | Stop reason: HOSPADM

## 2018-03-21 RX ORDER — NITROGLYCERIN 0.4 MG/1
0.4 TABLET SUBLINGUAL
Status: DISCONTINUED | OUTPATIENT
Start: 2018-03-21 | End: 2018-03-21

## 2018-03-21 RX ORDER — HEPARIN SODIUM 10000 [USP'U]/ML
2000-7000 INJECTION, SOLUTION INTRAVENOUS; SUBCUTANEOUS
Status: DISCONTINUED | OUTPATIENT
Start: 2018-03-21 | End: 2018-03-21

## 2018-03-21 RX ORDER — MAGNESIUM SULFATE HEPTAHYDRATE 40 MG/ML
2 INJECTION, SOLUTION INTRAVENOUS ONCE
Status: COMPLETED | OUTPATIENT
Start: 2018-03-21 | End: 2018-03-21

## 2018-03-21 RX ORDER — SODIUM CHLORIDE 9 MG/ML
75 INJECTION, SOLUTION INTRAVENOUS CONTINUOUS
Status: DISCONTINUED | OUTPATIENT
Start: 2018-03-21 | End: 2018-03-21

## 2018-03-21 RX ORDER — MAG HYDROX/ALUMINUM HYD/SIMETH 200-200-20
30 SUSPENSION, ORAL (FINAL DOSE FORM) ORAL
Status: DISCONTINUED | OUTPATIENT
Start: 2018-03-21 | End: 2018-03-23 | Stop reason: HOSPADM

## 2018-03-21 RX ORDER — HEPARIN SODIUM 200 [USP'U]/100ML
3 INJECTION, SOLUTION INTRAVENOUS CONTINUOUS
Status: DISCONTINUED | OUTPATIENT
Start: 2018-03-21 | End: 2018-03-21

## 2018-03-21 RX ORDER — MORPHINE SULFATE 2 MG/ML
2 INJECTION, SOLUTION INTRAMUSCULAR; INTRAVENOUS
Status: DISCONTINUED | OUTPATIENT
Start: 2018-03-21 | End: 2018-03-22

## 2018-03-21 RX ORDER — FENTANYL CITRATE 50 UG/ML
25-75 INJECTION, SOLUTION INTRAMUSCULAR; INTRAVENOUS
Status: DISCONTINUED | OUTPATIENT
Start: 2018-03-21 | End: 2018-03-21

## 2018-03-21 RX ORDER — METOPROLOL TARTRATE 25 MG/1
25 TABLET, FILM COATED ORAL EVERY 12 HOURS
Status: DISCONTINUED | OUTPATIENT
Start: 2018-03-21 | End: 2018-03-21

## 2018-03-21 RX ORDER — NITROGLYCERIN 0.4 MG/1
0.4 TABLET SUBLINGUAL
Status: DISCONTINUED | OUTPATIENT
Start: 2018-03-21 | End: 2018-03-23 | Stop reason: HOSPADM

## 2018-03-21 RX ORDER — ENOXAPARIN SODIUM 100 MG/ML
70 INJECTION SUBCUTANEOUS EVERY 12 HOURS
Status: DISCONTINUED | OUTPATIENT
Start: 2018-03-21 | End: 2018-03-23

## 2018-03-21 RX ORDER — WARFARIN SODIUM 5 MG/1
5 TABLET ORAL EVERY EVENING
Status: DISCONTINUED | OUTPATIENT
Start: 2018-03-21 | End: 2018-03-23

## 2018-03-21 RX ADMIN — ACETAMINOPHEN 650 MG: 325 TABLET, FILM COATED ORAL at 03:00

## 2018-03-21 RX ADMIN — NITROGLYCERIN 1 INCH: 20 OINTMENT TOPICAL at 01:00

## 2018-03-21 RX ADMIN — METOPROLOL TARTRATE 25 MG: 25 TABLET, FILM COATED ORAL at 07:59

## 2018-03-21 RX ADMIN — TICAGRELOR 180 MG: 90 TABLET ORAL at 12:45

## 2018-03-21 RX ADMIN — METOPROLOL TARTRATE 25 MG: 25 TABLET, FILM COATED ORAL at 01:00

## 2018-03-21 RX ADMIN — SODIUM CHLORIDE 10 MG/HR: 900 INJECTION, SOLUTION INTRAVENOUS at 05:43

## 2018-03-21 RX ADMIN — MAGNESIUM SULFATE HEPTAHYDRATE 2 G: 40 INJECTION, SOLUTION INTRAVENOUS at 18:11

## 2018-03-21 RX ADMIN — ENOXAPARIN SODIUM 70 MG: 80 INJECTION SUBCUTANEOUS at 18:02

## 2018-03-21 RX ADMIN — HEPARIN SODIUM 3 UNITS/HR: 200 INJECTION, SOLUTION INTRAVENOUS at 12:22

## 2018-03-21 RX ADMIN — HEPARIN SODIUM 2350 UNITS: 5000 INJECTION, SOLUTION INTRAVENOUS; SUBCUTANEOUS at 07:45

## 2018-03-21 RX ADMIN — ONDANSETRON 4 MG: 2 INJECTION INTRAMUSCULAR; INTRAVENOUS at 07:43

## 2018-03-21 RX ADMIN — Medication 10 ML: at 01:00

## 2018-03-21 RX ADMIN — LORAZEPAM 1 MG: 1 TABLET ORAL at 18:01

## 2018-03-21 RX ADMIN — Medication 1 AMPULE: at 07:38

## 2018-03-21 RX ADMIN — SODIUM CHLORIDE 2.5 MG/HR: 900 INJECTION, SOLUTION INTRAVENOUS at 07:39

## 2018-03-21 RX ADMIN — SODIUM CHLORIDE 75 ML/HR: 900 INJECTION, SOLUTION INTRAVENOUS at 12:57

## 2018-03-21 RX ADMIN — NITROGLYCERIN 1 INCH: 20 OINTMENT TOPICAL at 11:17

## 2018-03-21 RX ADMIN — MIDAZOLAM HYDROCHLORIDE 2 MG: 1 INJECTION, SOLUTION INTRAMUSCULAR; INTRAVENOUS at 12:38

## 2018-03-21 RX ADMIN — HEPARIN SODIUM 4000 UNITS: 10000 INJECTION, SOLUTION INTRAVENOUS; SUBCUTANEOUS at 12:54

## 2018-03-21 RX ADMIN — ASPIRIN 81 MG 81 MG: 81 TABLET ORAL at 07:38

## 2018-03-21 RX ADMIN — TICAGRELOR 90 MG: 90 TABLET ORAL at 22:54

## 2018-03-21 RX ADMIN — SODIUM CHLORIDE 75 ML/HR: 900 INJECTION, SOLUTION INTRAVENOUS at 00:26

## 2018-03-21 RX ADMIN — Medication 5 ML: at 22:57

## 2018-03-21 RX ADMIN — HEPARIN SODIUM 2 ML: 10000 INJECTION, SOLUTION INTRAVENOUS; SUBCUTANEOUS at 12:40

## 2018-03-21 RX ADMIN — SODIUM CHLORIDE 75 ML/HR: 900 INJECTION, SOLUTION INTRAVENOUS at 14:44

## 2018-03-21 RX ADMIN — WARFARIN SODIUM 5 MG: 5 TABLET ORAL at 18:01

## 2018-03-21 RX ADMIN — FENTANYL CITRATE 50 MCG: 50 INJECTION, SOLUTION INTRAMUSCULAR; INTRAVENOUS at 12:38

## 2018-03-21 RX ADMIN — LIDOCAINE HYDROCHLORIDE 60 MG: 20 INJECTION, SOLUTION INFILTRATION; PERINEURAL at 12:39

## 2018-03-21 RX ADMIN — NITROGLYCERIN 1 INCH: 20 OINTMENT TOPICAL at 06:00

## 2018-03-21 RX ADMIN — FENTANYL CITRATE 50 MCG: 50 INJECTION, SOLUTION INTRAMUSCULAR; INTRAVENOUS at 13:00

## 2018-03-21 RX ADMIN — SOTALOL HYDROCHLORIDE 120 MG: 80 TABLET ORAL at 19:58

## 2018-03-21 RX ADMIN — Medication 1 AMPULE: at 01:00

## 2018-03-21 RX ADMIN — IOPAMIDOL 120 ML: 755 INJECTION, SOLUTION INTRAVENOUS at 13:00

## 2018-03-21 RX ADMIN — ATORVASTATIN CALCIUM 80 MG: 40 TABLET, FILM COATED ORAL at 23:00

## 2018-03-21 RX ADMIN — Medication 5 ML: at 06:00

## 2018-03-21 RX ADMIN — HEPARIN SODIUM 7000 UNITS: 10000 INJECTION, SOLUTION INTRAVENOUS; SUBCUTANEOUS at 12:45

## 2018-03-21 RX ADMIN — Medication 10 ML: at 07:42

## 2018-03-21 RX ADMIN — HEPARIN SODIUM 3 UNITS/HR: 200 INJECTION, SOLUTION INTRAVENOUS at 12:25

## 2018-03-21 RX ADMIN — PERFLUTREN 1 ML: 6.52 INJECTION, SUSPENSION INTRAVENOUS at 08:00

## 2018-03-21 RX ADMIN — Medication 1 AMPULE: at 20:00

## 2018-03-21 NOTE — PROGRESS NOTES
[facility] CARDIOLOGY PROGRESS NOTE           3/21/2018 1:21 PM    Admit Date: 3/20/2018      Subjective:   Patient has converted to Apaced/Vpaced rhythm. S/P PCI mRCA. Echo with normal LVEF and akinetic and thinned proximal septum. MV leaflets are thickened with moderate mR and MS.      ROS:  Cardiovascular:  As noted above    Objective:      Vitals:    03/21/18 0759 03/21/18 0927 03/21/18 1117 03/21/18 1304   BP: 104/72 114/59 110/71 118/58   Pulse: 66 79 (!) 50 (!) 50   Resp:  17  24   Temp:  97.7 °F (36.5 °C)     SpO2:  92%  96%   Weight:       Height:           Physical Exam:  General-No Acute Distress  Neck- supple, no JVD  CV- regular rate and rhythm no MRG  Lung- clear bilaterally  Abd- soft, nontender, nondistended  Ext- no edema bilaterally. Skin- warm and dry    Data Review:   Recent Labs      03/21/18   0542  03/21/18   0146  03/20/18   1946   NA  139   --   139   K  4.1   --   3.3*   BUN  19   --   19   CREA  0.91   --   0.70   GLU  150*   --   112*   WBC  11.3*   --   11.8*   HGB  13.7   --   13.4   HCT  45.0   --   42.1   PLT  330   --   316   TROIQ  17.80*  18.10*  20.70*   CHOL  105   --    --    LDLC  66.8   --    --    HDL  20*   --    --        Assessment/Plan:     Principal Problem:    NSTEMI (non-ST elevated [facility]) [facility] (3/21/2018)    Patient s/p PCI of the mRCA. On ASA and Brilinta. Will stop ASA when INR > 2.0    Active Problems:    Tobacco use disorder (7/12/2013)    Cessation education      COPD (chronic obstructive pulmonary disease) ([facility] (7/12/2013)    Stop tobacco      Hypertrophic obstructive cardiomyopathy ([facility] (3/20/2018)    S/P septal ablation and no LVOT gradients      Atrial fibrillation with rapid ventricular response ([facility] (3/20/2018)    Now atrial.ventriculat paced rhythm. Load with sotalol 120mg BID and start warfarin. Bridge with lovenox until INR > 2.0.         Coronary artery disease involving native coronary artery of native heart (3/21/2018)    S/P PCI mRCA.   Start atrovastatin      Essential hypertension (3/21/2018)    This is controlled          Adelia Zhang MD  3/21/2018 1:21 PM

## 2018-03-21 NOTE — ED PROVIDER NOTES
Patient is a 47 y.o. female presenting with shortness of breath. The history is provided by the patient. Shortness of Breath   This is a new problem. The current episode started more than 2 days ago. The problem has been gradually worsening. Associated symptoms include chest pain. Pertinent negatives include no fever, no headaches, no rhinorrhea, no sore throat, no cough, no wheezing, no syncope, no vomiting, no abdominal pain, no rash and no leg swelling. The problem's precipitants include emotional upset. She has tried nothing for the symptoms. She has had prior hospitalizations. Associated medical issues do not include asthma, COPD, chronic lung disease, CAD, heart failure or past MI. Associated medical issues comments: Hypertrophic obstructive cardiomyopathy, pacer, prior alcohol ablation for HOCm, a.fib.         Past Medical History:   Diagnosis Date    Anemia 2/2011    and CHF; hospitalization at [place] [address] COPD     Hypertrophic cardiomyopathy ([facility] [address]     Sleep disorder 7/12/2013       Past Surgical History:   Procedure Laterality Date    CARDIAC SURG PROCEDURE UNLIST  2008    cardiac cath, defib    HX BREAST LUMPECTOMY  1995    right    HX CHOLECYSTECTOMY  1989    HX IMPLANTABLE CARDIOVERTER DEFIBRILLATOR  2008    PM    HX PACEMAKER  2008    pacemaker with defibrillator, septal ablation    HX TUBAL LIGATION  1992         Family History:   Problem Relation Age of Onset    Heart Disease Mother     Heart Attack Mother 50     massive MI    Diabetes Father     Heart Disease Father     Heart Disease Maternal Grandmother     Malignant Hyperthermia Neg Hx     Pseudocholinesterase Deficiency Neg Hx     Delayed Awakening Neg Hx     Post-op Nausea/Vomiting Neg Hx     Emergence Delirium Neg Hx     Post-op Cognitive Dysfunction Neg Hx     Other Neg Hx        Social History     Social History    Marital status: LEGALLY      Spouse name: N/A    Number of children: N/A    Years of education: N/A     Occupational History    Not on file. Social History Main Topics    Smoking status: Current Every Day Smoker     Packs/day: 0.50     Years: 30.00     Types: Cigarettes    Smokeless tobacco: Not on file    Alcohol use No      Comment: once a month    Drug use: No    Sexual activity: Yes     Partners: Male     Birth control/ protection: Surgical     Other Topics Concern    Not on file     Social History Narrative     from  and lives alone. No pets. She is disabled, previously worked in [employer] management. ALLERGIES: Sulfa (sulfonamide antibiotics)    Review of Systems   Constitutional: Positive for activity change. Negative for chills and fever. HENT: Negative for rhinorrhea and sore throat. Eyes: Negative for discharge and redness. Respiratory: Positive for chest tightness and shortness of breath. Negative for cough and wheezing. Cardiovascular: Positive for chest pain. Negative for palpitations, leg swelling and syncope. Gastrointestinal: Positive for nausea. Negative for abdominal pain and vomiting. Musculoskeletal: Negative for arthralgias and back pain. Skin: Negative for rash. Neurological: Negative for dizziness and headaches. Vitals:    03/20/18 1933 03/20/18 2031 03/20/18 2036   BP: 129/83 (!) 140/110 (!) 144/96   Pulse: 75 (!) 172 (!) 134   Resp: 20     Temp: 98.8 °F (37.1 °C)     SpO2: 98%     Weight: 63.5 kg (140 lb)     Height: 5' 2\" (1.575 m)              Physical Exam   Constitutional: She is oriented to person, place, and time. She appears well-developed and well-nourished. She appears distressed. HENT:   Head: Normocephalic and atraumatic. Eyes: Conjunctivae are normal. Pupils are equal, round, and reactive to light. Right eye exhibits no discharge. Left eye exhibits no discharge. No scleral icterus. Neck: Normal range of motion. Neck supple. Cardiovascular: Normal heart sounds.   An irregularly irregular rhythm present. Tachycardia present. Exam reveals no gallop. No murmur heard. A. Fib   heart rate in the 170s to 180s   Pulmonary/Chest: Effort normal and breath sounds normal. No respiratory distress. She has no wheezes. She has no rales. Abdominal: Soft. Bowel sounds are normal. There is no tenderness. There is no guarding. Musculoskeletal: Normal range of motion. She exhibits no edema. Neurological: She is alert and oriented to person, place, and time. She exhibits normal muscle tone. cni 2-12 grossly   Skin: Skin is warm and dry. She is not diaphoretic. Psychiatric: She has a normal mood and affect. Her behavior is normal.   Nursing note and vitals reviewed. MDM  Number of Diagnoses or Management Options  Atrial fibrillation with RVR ([address]): Hypertrophic obstructive cardiomyopathy [facility]):   NSTEMI (non-ST elevated myocardial infarction) [facility]):   Diagnosis management comments: Medical decision making note:  Patient has a chest pressure and racing heart,  History of A. Fib off of meds for a year and a half, has a pacemaker  History of hyper-trophic obstructive cardiomyopathy status post ethanol ablation  Patient believes she has had a diagnostic heart catheter and was told there were no serious blockages but she is sketchy on any details. .  RVR to slow down some after Cardizem bolus, now in the 120s, we will rebolus 15 mg and started drip at 10  Troponin is 20, subsequent EKG is still negative for acute infarction pattern    EKG from 2009 reviewed   right bundle-branch block noted with sinus rhythm. Heparin drip, aspirin, fluids    Admit to Cardiology  This concludes the \"medical decision making note\" part of this emergency department visit note.           ED Course       Procedures

## 2018-03-21 NOTE — ROUTINE PROCESS
Cath Lab Transfer In    Transferred from tele  Transferred to : Cath Lab Procedure Room 2  Reason for Transfer: [address] [place] PCI    Attending physician: Starr Mills        Patient Assessment    Patient received into procedure room. No acute distress noted or verbalized. Procedural prep completed. Iv accesses assessed for patency. Review of pertinent labs and allergies completed. Noted presence of current History & Physical, updated within the previous 24 hours. Consent signed.

## 2018-03-21 NOTE — PROGRESS NOTES
Bedside and Verbal shift change report given to Nick Wilhelm, RN and Nuria West RN (oncoming nurse) by self Jarod lane). Report included the following information SBAR, Kardex, MAR and Recent Results.

## 2018-03-21 NOTE — PROGRESS NOTES
RN and Sangeeta Vazquez RN received bedside and verbal report from [facility].

## 2018-03-21 NOTE — PROCEDURES
Brief Cardiac Procedure Note    Patient: Tuan Dash MRN: [id number]  SSN: [id number]    YOB: 1965  Age: 47 y.o. Sex: female      Date of Procedure: 3/21/2018     Pre-procedure Diagnosis: NSTEMI    Post-procedure Diagnosis: Coronary Artery Disease    Procedure: Left Heart Catheterization with Percutaneous Coronary Intervention    Brief Description of Procedure: See note    Performed By: Babatunde Yo MD     Assistants: None    Anesthesia: Moderate Sedation    Estimated Blood Loss: Less than 10 mL      Specimens: None    Implants: None    Findings:   LV:  EF 50%  LM:  NML  LAD:  NML  RI:  NML  LCx:  NML  RCA:  80% mid    PCI mRCA 80-0%   5.8Q15 Zay       Complications: None    Recommendations: Continue medical therapy.     Signed By: Babatunde Yo MD     March 21, 2018

## 2018-03-21 NOTE — PROCEDURES
[address] Dionne Ramey  MR#: [id number]  : 1965  ACCOUNT #: [de-identified]   DATE OF SERVICE: 2018    PRIMARY CARDIOLOGIST:  Sophie Shin MD.     PRIMARY CARE PHYSICIAN:  Ania Fernandes MD.      BRIEF HISTORY:  The patient is a 30-year-old female with history of hypertrophic cardiomyopathy. She had septal ablation performed approximately five years ago. Echocardiogram historically has shown mild to moderate mitral stenosis, mild to moderate mitral regurgitation. Echocardiogram today demonstrates normal left ventricular systolic function with moderate mitral stenosis and moderate eccentric mitral regurgitation present. She is admitted with a non-ST elevation myocardial infarction in the face of atrial fibrillation with rapid ventricular response, and referred for cardiac catheterization. DESCRIPTION OF PROCEDURE:  After  informed consent, she was prepped and draped in the usual sterile fashion. Right wrist was infiltrated with lidocaine. The right radial artery was accessed. A 6-Ukrainian sheath was advanced. A 5-Ukrainian Tiger catheter was utilized for left and right coronary injections, a 5-Ukrainian angle pigtail for left echography. Isovue contrast utilized. CONSCIOUS SEDATION  Start time 1230, end time 1300. MEDICATIONS:  3 mg of Versed and 100 mcg fentanyl. MONITORING RN:  Jose A Gomez. FINDINGS:  1. Left ventricle:  Normal left ventricular size, normal left ventricular systolic function, ejection fraction is 50% to 55%. There is 2+ mitral regurgitation. No aortic valve gradient. Left ventricular end diastolic pressure is measured at 26 mmHg. 2.  Left main:  Left main is large trifurcates the LAD, ramus and circumflex vessels. Angiographically normal.   3.  Left anterior descending coronary: This is a moderate sized vessel.   There is no significant diagonal.  The entire system is angiographically normal.  4. Ramus intermedius: It is a moderate size vessel, appears angiographically normal.  5.  Circumflex coronary: It is a moderate sized vessel with conduit to a single obtuse marginal which appears angiographically normal.  6.  Right coronary: It is a large anatomically dominant vessel, has a focal napkin ring 80% mid right coronary artery stenosis near the crux, gives rise to a moderate size posterior descending and posterolateral branch. Angiographically normal.    PERCUTANEOUS INTERVENTION:      LESION:  Mid right coronary artery. Pre-stenosis 80%, post-stenosis 0%. DETAILS:  Patient anticoagulated with Angiomax. A 6-English JR5 guide was utilized. A GiveForward wire was advanced distally. The lesion was directly stented with a 3.5 x 26 [place] drug-eluting stent inflated to 16 atmospheres. This yielded an excellent angiographic result. The wire was removed. Orthogonal views were obtained. CONCLUSION:   1. Normal left ventricular systolic function with moderate mitral regurgitation. 2.  Focal mid right coronary artery stenosis, status post [place] drug-eluting stent. 3.  Echocardiogram suggesting moderate mitral regurgitation, moderate mitral stenosis. RECOMMENDATIONS:  The patient will be admitted for sotalol loading. She has now converted to atrial ventricular paced rhythm. We will start sotalol 120 mg twice daily. We will monitor QTs with EKGs. May need to increase basal pacing rate. She is pacing at a rate of 50 currently. The patient will be managed with warfarin and Brilinta. Aspirin will be continued until INR greater than 2. At that time, aspirin can be discontinued. Thank you for allowing us to participate in the care of this patient. If you have any questions or concerns, please feel free to contact me.       MD CHHAYA Blackmon / MACEY  D: 03/21/2018 13:29     T: 03/21/2018 14:18  JOB #: [id number]  CC: Leroy Wei MD  CC: Jong Ramirez MD

## 2018-03-21 NOTE — PROGRESS NOTES
Bedside and Verbal shift change report given to Daniele Del Rosario RN (oncoming nurse) by self (offgoing nurse). Report included the following information SBAR, Kardex, MAR and Recent Results.

## 2018-03-21 NOTE — PROGRESS NOTES
Transported back to room 318 via bed, monitored, accompanied by RN. Right wrist without bleeding or hematoma. Erin, RN, in room to assess patient.

## 2018-03-21 NOTE — PROGRESS NOTES
Bedside and Verbal shift change report given to self (oncoming nurse) by Nan Berg RN (offgoing nurse). Report included the following information SBAR, Kardex and Recent Results.

## 2018-03-21 NOTE — ROUTINE PROCESS
TRANSFER - OUT REPORT:    [facility] with PCI  Dr. Starr Mills  RRA access    Versed 2mg, fentanyl 100mcg, heparin 36069 units IV, brilinta 180mg  One stent in RCA  TR band placed right wrist @ 1303 with 12ml air    Verbal report given to [address] RN(name) on [facility]  being transferred to [facility](unit) for routine progression of care       Report consisted of patients Situation, Background, Assessment and   Recommendations(SBAR). Information from the following report(s) Procedure Summary was reviewed with the receiving nurse. Lines:   Peripheral IV 03/20/18 Right External jugular (Active)   Site Assessment Clean, dry, & intact 3/21/2018  7:50 AM   Phlebitis Assessment 0 3/21/2018  7:50 AM   Infiltration Assessment 0 3/21/2018  7:50 AM   Dressing Status Clean, dry, & intact 3/21/2018  7:50 AM   Dressing Type Tape;Transparent 3/21/2018  7:50 AM   Hub Color/Line Status Infusing 3/21/2018  7:50 AM   Alcohol Cap Used No 3/21/2018  4:00 AM       Peripheral IV 03/20/18 Left External jugular (Active)   Site Assessment Clean, dry, & intact 3/21/2018  7:50 AM   Phlebitis Assessment 0 3/21/2018  7:50 AM   Infiltration Assessment 0 3/21/2018  7:50 AM   Dressing Status Clean, dry, & intact 3/21/2018  7:50 AM   Dressing Type Tape;Transparent 3/21/2018  7:50 AM   Hub Color/Line Status Patent; Flushed 3/21/2018  7:50 AM   Alcohol Cap Used No 3/21/2018  4:00 AM        Opportunity for questions and clarification was provided.       Patient transported with:   Registered Nurse

## 2018-03-21 NOTE — PROGRESS NOTES
Patient HR ranging from 50-70's and is still in A. Flutter. Spoke to Windy Pisano. Stop Cardizem gtt and given Lopressor 25 mg NOW.

## 2018-03-21 NOTE — PROGRESS NOTES
Problem: Falls - Risk of  Goal: *Absence of Falls  Document Abi Fall Risk and appropriate interventions in the flowsheet. Outcome: Progressing Towards Goal  Fall Risk Interventions:  Mobility Interventions: Patient to call before getting OOB           Pt progressing towards goal. No falls since admission. Bed low and locked. Call light within reach. Side rails x 2. Gripper socks applied. Personal belongings within reach. Pt verbalizes understanding to call for assistance. Bed alarm on. Elimination Interventions: Call light in reach    History of Falls Interventions:  Investigate reason for fall

## 2018-03-21 NOTE — PROGRESS NOTES
Patient Mag level 1.7. Spoke with TARA Dhaliwal and received orders to hold sotalol and give 2g mag bolus now. Will give sotalol after mag bolus complete.

## 2018-03-21 NOTE — PROGRESS NOTES
made initial visit. Pt was alert, verbal, and appeared uncomfortable.  welcomed pt to DT and shared about  services.  provided spiritual care through presence, pastoral conversation, and assurance of prayer.

## 2018-03-21 NOTE — PROGRESS NOTES
Problem: Falls - Risk of  Goal: *Absence of Falls  Document Abi Fall Risk and appropriate interventions in the flowsheet. Outcome: Progressing Towards Goal  Fall Risk Interventions:  Pt progressing towards goal. No falls since admission. Bed low and locked. Call light within reach. Side rails x 2. Gripper socks applied. Personal belongings within reach. Pt verbalizes understanding to call for assistance. Mobility Interventions: Patient to call before getting OOB              Elimination Interventions: Call light in reach    History of Falls Interventions:  Investigate reason for fall

## 2018-03-21 NOTE — PROGRESS NOTES
[facility] Cardiology Admission Note         Subjective:   46 yo F with history of HOCM, prior ETOH septal ablation at [address] about 5 years ago. Also has history of PAF for 1-2 years. Presents with a one week history of tachycardia and developed chest tightness today. Found to be in AF with RVR at 140+ bpm.  Troponin 20. Cath about 5 years ago - [facility] - no definite CAD. Currently having severe emotional stress - arguments with boyfriend. She saw Dr. Claudine Pulliam until 2 years ago when he left [facility]. Saw a cardiologist in [place] for AF 2 years ago, who advised AF ablation, but she did not follow-up. Had taken Verapamil, lasix, Eliquis and Lisinopril in the past - stopped meds about 2 years ago.      Current Problems:  Active Problems:    Tobacco use disorder (7/12/2013)      Overview: Smoking current      COPD (chronic obstructive pulmonary disease) ([address] (7/12/2013)      Hypertrophic obstructive cardiomyopathy (HCC) (3/20/2018) - s/p ETOH septal ablation [facility]      Elevated troponin (3/20/2018)      Atrial fibrillation with rapid ventricular response ([address] (3/20/2018)      Family history of hypertrophic cardiomyopathy (3/20/2018)      Chest pain        PMH & PSH:  Past Medical History:   Diagnosis Date    Anemia 2/2011    and CHF; hospitalization at [facility] Atrial fibrillation with rapid ventricular response ([address] 3/20/2018    COPD     Hypertrophic cardiomyopathy ([address]     Sleep disorder 7/12/2013     Past Surgical History:   Procedure Laterality Date    CARDIAC SURG PROCEDURE UNLIST  2008    cardiac cath, defib    HX BREAST LUMPECTOMY  1995    right    HX CHOLECYSTECTOMY  1989    HX IMPLANTABLE CARDIOVERTER DEFIBRILLATOR  2008    PM    HX PACEMAKER  2008    pacemaker with defibrillator, septal ablation    HX TUBAL LIGATION  1992       SH:  Social History     Social History    Marital status: LEGALLY      Spouse name: N/A    Number of children: N/A    Years of education: N/A Social History Main Topics    Smoking status: Current Every Day Smoker     Packs/day: 0.50     Years: 30.00     Types: Cigarettes    Smokeless tobacco: None    Alcohol use No      Comment: once a month    Drug use: No    Sexual activity: Yes     Partners: Male     Birth control/ protection: Surgical     Other Topics Concern    None     Social History Narrative     from  and lives alone. No pets. She is disabled, previously worked in restaurant management. FH:  mother  from HOCM at 50. Sister has HOCM. Family History   Problem Relation Age of Onset    Heart Disease Mother     Heart Attack Mother 50     massive MI    Diabetes Father     Heart Disease Father     Heart Disease Maternal Grandmother     Malignant Hyperthermia Neg Hx     Pseudocholinesterase Deficiency Neg Hx     Delayed Awakening Neg Hx     Post-op Nausea/Vomiting Neg Hx     Emergence Delirium Neg Hx     Post-op Cognitive Dysfunction Neg Hx     Other Neg Hx        Home Meds:  Prior to Admission Medications   Prescriptions Last Dose Informant Patient Reported? Taking? HYDROcodone-acetaminophen (LORTAB 10/500)  mg per tablet   Yes No   Sig: Take 1 Tab by mouth every six (6) hours as needed. Patient instructed to take morning of surgery per anesthesia guidelines        aluminum-magnesium hydroxide 200-200 mg/5 mL Susp 5 mL, diphenhydrAMINE 12.5 mg/5 mL Liqd 5 mL, lidocaine 2 % Soln 5 mL   Yes No   Si mL by Swish and Spit route two (2) times a day. Magic mouth wash   Maalox  Lidocaine 2% viscous   Diphenhydramine oral solution     Pharmacy to mix equal portions of ingredients to a total volume as indicated in the dispense amount. buPROPion SR (WELLBUTRIN SR) 150 mg SR tablet   Yes No   Sig: Take 150 mg by mouth two (2) times a day.  Patient instructed to take morning of surgery per anesthesia guidelines        budesonide-formoterol (SYMBICORT) 160-4.5 mcg/actuation HFA inhaler   No No   Sig: Take 2 Puffs by inhalation two (2) times a day. Patient instructed to take morning of surgery per anesthesia guidelines   diazepam (VALIUM) 10 mg tablet   Yes No   Sig: Take 10 mg by mouth every six (6) hours as needed. furosemide (LASIX) 20 mg tablet   Yes No   Sig: Take 20 mg by mouth daily. levalbuterol tartrate (XOPENEX HFA) 45 mcg/actuation inhaler   No No   Sig: Take 2 Puffs by inhalation every four (4) hours as needed for Wheezing. Patient instructed to bring to hospital morning of surgery per anesthesia guidelines   metoprolol (LOPRESSOR) 50 mg tablet   Yes No   Sig: Take  by mouth two (2) times a day. sertraline (ZOLOFT) 100 mg tablet   Yes No   Sig: Take 150 mg by mouth daily. Patient instructed to take morning of surgery per anesthesia guidelines        traZODone (DESYREL) 150 mg tablet   Yes No   Sig: Take 150 mg by mouth nightly. verapamil (CALAN) 120 mg tablet   Yes No   Sig: Take 120 mg by mouth daily. Patient instructed to take morning of surgery per anesthesia guidelines           Facility-Administered Medications: None       Allergies: Allergies   Allergen Reactions    Sulfa (Sulfonamide Antibiotics) Itching       ROS:  A comprehensive review of systems was negative except for that written in the HPI. Exam:     VITALS:   Visit Vitals    /78    Pulse (!) 106    Temp 98.8 °F (37.1 °C)    Resp 16    Ht 5' 2\" (1.575 m)    Wt 63.5 kg (140 lb)    SpO2 96%    BMI 25.61 kg/m2     RHYTHM: AF with RVR  HEAD/NECK: PEERL, no JVD, carotid upstrokes normal without bruits, no thyromegaly or adenopathy. No xanthelasma. CHEST: clear to auscultation and percussion. CARDIAC: normal S1, S2, without murmur, click, or gallop. Apical impulse normal.  ABDOMEN: soft & nontender, without mass, bruit, organomegaly. EXTREMITIES: pulses are full and symmetrical; no edema. MUSCULOSKELETAL: normal to inspection and palpation; normal ROM.   NEURO: normal symmetrical strength, sensation, and cranial nerves. No focal deficits. SKIN: warm and dry. MENTAL STATUS: oriented in all spheres, normal mood and affect. EKG:  AF, RBBB, no ST depression  ECHO:  pending    Labs:  Recent Labs      03/20/18 1946   NA  139   K  3.3*   BUN  19   CREA  0.70   GLU  112*   WBC  11.8*   HGB  13.4   HCT  42.1   PLT  316        Assessment:   AF with RVR  Elevated troponin, chest pain - probable NSTEMI. HOCM, s/p ETOH septal ablation. Smoker     Plan:   Admit, IV Cardizem, low dose metoprolol. ASA, heparin, topical nitrates. Cath tomorrow. May need to adjust meds further after coronary work-up. Check echo.  EKG in am.     Sarwat Timmons MD

## 2018-03-21 NOTE — PROGRESS NOTES
TRANSFER - IN REPORT:    Verbal report received from Delilah Rangel, [facility] (name) on Sloan Mendosa  being received from ED(unit) for routine progression of care      Report consisted of patients Situation, Background, Assessment and   Recommendations(SBAR). Information from the following report(s) SBAR and ED Summary was reviewed with the receiving nurse. Opportunity for questions and clarification was provided. Assessment completed upon patients arrival to unit and care assumed. Dual skin assessment completed with second RN reveals skin is intact. Sacrum and heels visualized.

## 2018-03-21 NOTE — PROGRESS NOTES
Radial compression band removed at 1640 after slowly reducing air from 12 cc to zero as per hospital protocol. No bleeding or hematoma noted. 2 x 2 gauze with tegaderm placed over puncture site. The affected extremity is warm and dry to the touch. Frequent vital signs documented per flowsheet. Patient instructed to call if any bleeding noted on gauze. Patient verbalized understanding the nursing instructions.

## 2018-03-22 LAB
AMPHET UR QL SCN: POSITIVE
ANION GAP SERPL CALC-SCNC: 12 MMOL/L (ref 7–16)
ATRIAL RATE: 58 BPM
BARBITURATES UR QL SCN: NEGATIVE
BASOPHILS # BLD: 0 K/UL (ref 0–0.2)
BASOPHILS NFR BLD: 0 % (ref 0–2)
BENZODIAZ UR QL: POSITIVE
BUN SERPL-MCNC: 23 MG/DL (ref 6–23)
CALCIUM SERPL-MCNC: 8.2 MG/DL (ref 8.3–10.4)
CALCULATED P AXIS, ECG09: 33 DEGREES
CALCULATED R AXIS, ECG10: -51 DEGREES
CALCULATED T AXIS, ECG11: 92 DEGREES
CANNABINOIDS UR QL SCN: NEGATIVE
CHLORIDE SERPL-SCNC: 102 MMOL/L (ref 98–107)
CO2 SERPL-SCNC: 21 MMOL/L (ref 21–32)
COCAINE UR QL SCN: NEGATIVE
CREAT SERPL-MCNC: 1.01 MG/DL (ref 0.6–1)
DIAGNOSIS, 93000: NORMAL
DIFFERENTIAL METHOD BLD: ABNORMAL
EOSINOPHIL # BLD: 0.2 K/UL (ref 0–0.8)
EOSINOPHIL NFR BLD: 1 % (ref 0.5–7.8)
ERYTHROCYTE [DISTWIDTH] IN BLOOD BY AUTOMATED COUNT: 15.1 % (ref 11.9–14.6)
GLUCOSE SERPL-MCNC: 100 MG/DL (ref 65–100)
HCT VFR BLD AUTO: 40.3 % (ref 35.8–46.3)
HGB BLD-MCNC: 12.5 G/DL (ref 11.7–15.4)
IMM GRANULOCYTES # BLD: 0.1 K/UL (ref 0–0.5)
IMM GRANULOCYTES NFR BLD AUTO: 0 % (ref 0–5)
INR PPP: 1.5
LYMPHOCYTES # BLD: 2.8 K/UL (ref 0.5–4.6)
LYMPHOCYTES NFR BLD: 19 % (ref 13–44)
MAGNESIUM SERPL-MCNC: 2.3 MG/DL (ref 1.8–2.4)
MCH RBC QN AUTO: 24.8 PG (ref 26.1–32.9)
MCHC RBC AUTO-ENTMCNC: 31 G/DL (ref 31.4–35)
MCV RBC AUTO: 80 FL (ref 79.6–97.8)
METHADONE UR QL: POSITIVE
MONOCYTES # BLD: 1.6 K/UL (ref 0.1–1.3)
MONOCYTES NFR BLD: 11 % (ref 4–12)
NEUTS SEG # BLD: 10.1 K/UL (ref 1.7–8.2)
NEUTS SEG NFR BLD: 69 % (ref 43–78)
OPIATES UR QL: POSITIVE
P-R INTERVAL, ECG05: 194 MS
PCP UR QL: NEGATIVE
PLATELET # BLD AUTO: 339 K/UL (ref 150–450)
PMV BLD AUTO: 10.6 FL (ref 10.8–14.1)
POTASSIUM SERPL-SCNC: 3.5 MMOL/L (ref 3.5–5.1)
PROTHROMBIN TIME: 18 SEC (ref 11.5–14.5)
Q-T INTERVAL, ECG07: 468 MS
QRS DURATION, ECG06: 118 MS
QTC CALCULATION (BEZET), ECG08: 459 MS
RBC # BLD AUTO: 5.04 M/UL (ref 4.05–5.25)
SODIUM SERPL-SCNC: 135 MMOL/L (ref 136–145)
VENTRICULAR RATE, ECG03: 58 BPM
WBC # BLD AUTO: 14.9 K/UL (ref 4.3–11.1)

## 2018-03-22 PROCEDURE — 65660000000 HC RM CCU STEPDOWN

## 2018-03-22 PROCEDURE — 83735 ASSAY OF MAGNESIUM: CPT | Performed by: INTERNAL MEDICINE

## 2018-03-22 PROCEDURE — 80048 BASIC METABOLIC PNL TOTAL CA: CPT | Performed by: INTERNAL MEDICINE

## 2018-03-22 PROCEDURE — 74011250636 HC RX REV CODE- 250/636: Performed by: INTERNAL MEDICINE

## 2018-03-22 PROCEDURE — 85610 PROTHROMBIN TIME: CPT | Performed by: INTERNAL MEDICINE

## 2018-03-22 PROCEDURE — 85025 COMPLETE CBC W/AUTO DIFF WBC: CPT | Performed by: INTERNAL MEDICINE

## 2018-03-22 PROCEDURE — 74011000250 HC RX REV CODE- 250: Performed by: NURSE PRACTITIONER

## 2018-03-22 PROCEDURE — 74011250637 HC RX REV CODE- 250/637: Performed by: NURSE PRACTITIONER

## 2018-03-22 PROCEDURE — 74011250637 HC RX REV CODE- 250/637: Performed by: INTERNAL MEDICINE

## 2018-03-22 PROCEDURE — 94760 N-INVAS EAR/PLS OXIMETRY 1: CPT

## 2018-03-22 PROCEDURE — 36415 COLL VENOUS BLD VENIPUNCTURE: CPT | Performed by: INTERNAL MEDICINE

## 2018-03-22 PROCEDURE — 94640 AIRWAY INHALATION TREATMENT: CPT

## 2018-03-22 RX ORDER — METOPROLOL SUCCINATE 50 MG/1
50 TABLET, EXTENDED RELEASE ORAL DAILY
Status: DISCONTINUED | OUTPATIENT
Start: 2018-03-22 | End: 2018-03-23 | Stop reason: HOSPADM

## 2018-03-22 RX ORDER — CLOPIDOGREL BISULFATE 75 MG/1
75 TABLET ORAL DAILY
Status: DISCONTINUED | OUTPATIENT
Start: 2018-03-23 | End: 2018-03-23 | Stop reason: HOSPADM

## 2018-03-22 RX ORDER — LEVALBUTEROL INHALATION SOLUTION 0.63 MG/3ML
0.63 SOLUTION RESPIRATORY (INHALATION)
Status: DISCONTINUED | OUTPATIENT
Start: 2018-03-22 | End: 2018-03-23 | Stop reason: HOSPADM

## 2018-03-22 RX ORDER — AMIODARONE HYDROCHLORIDE 200 MG/1
200 TABLET ORAL EVERY 12 HOURS
Status: DISCONTINUED | OUTPATIENT
Start: 2018-03-23 | End: 2018-03-22

## 2018-03-22 RX ORDER — CLOPIDOGREL BISULFATE 75 MG/1
300 TABLET ORAL ONCE
Status: COMPLETED | OUTPATIENT
Start: 2018-03-22 | End: 2018-03-22

## 2018-03-22 RX ADMIN — ENOXAPARIN SODIUM 70 MG: 80 INJECTION SUBCUTANEOUS at 06:13

## 2018-03-22 RX ADMIN — Medication 5 ML: at 06:13

## 2018-03-22 RX ADMIN — Medication 1 AMPULE: at 21:40

## 2018-03-22 RX ADMIN — LEVALBUTEROL HYDROCHLORIDE 0.63 MG: 0.63 SOLUTION RESPIRATORY (INHALATION) at 05:20

## 2018-03-22 RX ADMIN — ATORVASTATIN CALCIUM 80 MG: 40 TABLET, FILM COATED ORAL at 21:40

## 2018-03-22 RX ADMIN — LORAZEPAM 0.5 MG: 0.5 TABLET ORAL at 21:40

## 2018-03-22 RX ADMIN — LORAZEPAM 0.5 MG: 0.5 TABLET ORAL at 15:26

## 2018-03-22 RX ADMIN — CLOPIDOGREL BISULFATE 300 MG: 75 TABLET ORAL at 10:40

## 2018-03-22 RX ADMIN — ASPIRIN 81 MG 81 MG: 81 TABLET ORAL at 08:38

## 2018-03-22 RX ADMIN — Medication 1 AMPULE: at 08:37

## 2018-03-22 RX ADMIN — METOPROLOL SUCCINATE 50 MG: 50 TABLET, EXTENDED RELEASE ORAL at 10:40

## 2018-03-22 RX ADMIN — WARFARIN SODIUM 5 MG: 5 TABLET ORAL at 18:28

## 2018-03-22 RX ADMIN — Medication 10 ML: at 21:45

## 2018-03-22 RX ADMIN — Medication 10 ML: at 14:41

## 2018-03-22 RX ADMIN — ENOXAPARIN SODIUM 70 MG: 80 INJECTION SUBCUTANEOUS at 18:28
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
## 2018-03-22 NOTE — PROGRESS NOTES
During bedside report and initial assessment at 1845, the previous nurse reported the patient had received PRN ativan at 1800 and patient had remained intermittently sleepy since PRN dose was administered. The patient was also noted to have garbled and incomprehensible speech. The daughter, at bedside confirmed this was baseline for the patient and off-going nurse stated the speech is unchanged from admission. At 2030, the patient was complaining of acute shortness of breath, noted to be trembling and anxious. Caffeinated pepsi was given to rule out that the dyspnea was related to the initial dose of brilinta given today 3/21 post cath procedure. Patient's respirations improved and patient returned to rest shortly after. At 2215, Upon awaking patient to take 2200 meds as ordered, the patient was found to be very drowsy and difficult to arouse. After several minutes, the patient was alert enough to have conversation and was questioned about her home medications. During admission database on 3/21, the patient stated she was not currently taking or prescribed any medications. Based on the ED note, the patient stated that she had not taken her prescribed lasix for several days. I questioned the patient to clarify before administering new medications that were ordered per MD. The patient stated she had not followed up with her doctors after several previous admissions and she only took lasix when she felt worsening dyspnea, and did not take as ordered. Patient admitted to using methadone, methamphetamines, cocaine, and marijuana recently. Patient states her last meth use was 2 days ago. Patient states she often uses drugs when she feels anxious and patient states the current anxiety has the same presentation when she is at home. Discussed with Kade Pena Cardiology NP; new orders received.

## 2018-03-22 NOTE — PROGRESS NOTES
Attempted to see patient but she requested I come back as she was \"feeling sick\" and not able to talk with me. Noted patient with history of polysubtance abuse and monitoring for withdrawals. Will follow up with patient for d/c needs.

## 2018-03-22 NOTE — ROUTINE PROCESS
Results of Urine Drug Screen    BENZODIAZEPINES POSITIVE Received Versed in procedure & Ativan on 3/21/18   COCAINE NEGATIVE     AMPHETAMINES POSITIVE    METHADONE POSITIVE    THC (TH-CANNABINOL) NEGATIVE     OPIATES POSITIVE Received Fentanyl in procedure on 3/21/18   BARBITURATES NEGATIVE

## 2018-03-22 NOTE — PROGRESS NOTES
Cardiac Rehab:  Spoke with patient regarding referral to cardiac rehab. Patient meets admission criteria based on PCI(date3/21/2018). Discussed lifestyle modifications to promote cardiac wellness. Patient indicated that she is not sure if she wants to participate in cardiac rehab program.  Encouraged patient to discuss exercise and lifestyle factors with MD.  Voices understanding.

## 2018-03-22 NOTE — PROGRESS NOTES
Bedside and Verbal shift change report given to self (oncoming nurse) by Sathish Buchanan (offgoing nurse). Report included the following information SBAR, Kardex and Recent Results.

## 2018-03-22 NOTE — PROGRESS NOTES
Progress Note    3/22/2018  Admit Date: 3/20/2018  8:01 PM   NAME: Shey Hurt   :  1965   MRN:  [id number]   Attending: Aníbal Lamar MD  PCP:  Jessica Galaviz MD  Treatment Team: Attending Provider: Aníbal Lamar MD; Utilization Review: Freida Ramirez RN; Consulting Provider: Gina Soto DO    Full Code   SUBJECTIVE:   Ms. Tish Guido is a 46 yo female with PMH of hypertrophic obstructive cardiomyopathy, hx of ablation at [address], hx afib who presented to ED 3/20/18 with c/o SOB and CP. Was found to be in AF with RVR, elevated troponin. Underwent PCI 3/21/18 with placement of drug eluting stent. UDS +for benzos, methamphetamines, methadone. Admits to buying xanax, meth and methadone off the streets. Hospitalist consulted for polypharmacy withdrawal.  Pt started on prn ativan and has controlled withdrawals. She is quite drowsy today but is able to discuss cessation resources. Denies CP, SOB, HA, anxiety, agitation.         Past Medical History:   Diagnosis Date    Anemia 2011    and CHF; hospitalization [facility] Atrial fibrillation with rapid ventricular response ([facility] 3/20/2018    COPD     Depression     Essential hypertension 3/21/2018    Hypertrophic cardiomyopathy ([facility]     NSTEMI (non-ST elevated myocardial infarction) ([facility] 3/21/2018    Obesity     Sleep disorder 2013     Recent Results (from the past 24 hour(s))   EKG, 12 LEAD, SUBSEQUENT    Collection Time: 18 11:48 AM   Result Value Ref Range    Ventricular Rate 50 BPM    Atrial Rate 50 BPM    P-R Interval 174 ms    QRS Duration 130 ms    Q-T Interval 504 ms    QTC Calculation (Bezet) 459 ms    Calculated P Axis 12 degrees    Calculated R Axis -52 degrees    Calculated T Axis 108 degrees    Diagnosis       Demand pacemaker; interpretation is based on intrinsic rhythm  Possible Failure to sense of atrial pacemaker  Sinus bradycardia with Fusion complexes  Left axis deviation  Right bundle branch block  Inferior infarct , age undetermined  T wave abnormality, consider lateral ischemia  Abnormal ECG  When compared with ECG of 20-MAR-2018 19:51,  Significant changes have occurred  Confirmed by [facility] DENISE SEE ()MILAGROS ([id number]) on 3/21/2018 12:17:23 PM     POC ACTIVATED CLOTTING TIME    Collection Time: 03/21/18 12:50 PM   Result Value Ref Range    Activated Clotting Time (POC) 153 (H) 70 - 128 SECS   POC ACTIVATED CLOTTING TIME    Collection Time: 03/21/18 12:58 PM   Result Value Ref Range    Activated Clotting Time (POC) 406 (H) 70 - 128 SECS   MAGNESIUM    Collection Time: 03/21/18  5:27 PM   Result Value Ref Range    Magnesium 1.7 (L) 1.8 - 2.4 mg/dL   EKG, 12 LEAD, INITIAL    Collection Time: 03/21/18 10:07 PM   Result Value Ref Range    Ventricular Rate 58 BPM    Atrial Rate 58 BPM    P-R Interval 194 ms    QRS Duration 118 ms    Q-T Interval 468 ms    QTC Calculation (Bezet) 459 ms    Calculated P Axis 33 degrees    Calculated R Axis -51 degrees    Calculated T Axis 92 degrees    Diagnosis       Sinus rhythm  Electronic ventricular pacemaker  When compared with ECG of 21-MAR-2018 11:48,  Electronic ventricular pacemaker has replaced Sinus rhythm  Confirmed by [facility] DENISE SEE ()MILAGROS ([id number]) on 3/22/2018 6:39:37 AM     DRUG SCREEN, URINE    Collection Time: 03/21/18 10:53 PM   Result Value Ref Range    PCP(PHENCYCLIDINE) NEGATIVE       BENZODIAZEPINES POSITIVE      COCAINE NEGATIVE       AMPHETAMINES POSITIVE      METHADONE POSITIVE      THC (TH-CANNABINOL) NEGATIVE       OPIATES POSITIVE      BARBITURATES NEGATIVE      PROTHROMBIN TIME + INR    Collection Time: 03/22/18  4:49 AM   Result Value Ref Range    Prothrombin time 18.0 (H) 11.5 - 14.5 sec    INR 1.5     METABOLIC PANEL, BASIC    Collection Time: 03/22/18  4:49 AM   Result Value Ref Range    Sodium 135 (L) 136 - 145 mmol/L    Potassium 3.5 3.5 - 5.1 mmol/L    Chloride 102 98 - 107 mmol/L    CO2 21 21 - 32 mmol/L    Anion gap 12 7 - 16 mmol/L    Glucose 100 65 - 100 mg/dL    BUN 23 6 - 23 MG/DL    Creatinine 1.01 (H) 0.6 - 1.0 MG/DL    GFR est AA >60 >60 ml/min/1.73m2    GFR est non-AA >60 >60 ml/min/1.73m2    Calcium 8.2 (L) 8.3 - 10.4 MG/DL   CBC WITH AUTOMATED DIFF    Collection Time: 03/22/18  4:49 AM   Result Value Ref Range    WBC 14.9 (H) 4.3 - 11.1 K/uL    RBC 5.04 4.05 - 5.25 M/uL    HGB 12.5 11.7 - 15.4 g/dL    HCT 40.3 35.8 - 46.3 %    MCV 80.0 79.6 - 97.8 FL    MCH 24.8 (L) 26.1 - 32.9 PG    MCHC 31.0 (L) 31.4 - 35.0 g/dL    RDW 15.1 (H) 11.9 - 14.6 %    PLATELET 909 500 - 517 K/uL    MPV 10.6 (L) 10.8 - 14.1 FL    DF AUTOMATED      NEUTROPHILS 69 43 - 78 %    LYMPHOCYTES 19 13 - 44 %    MONOCYTES 11 4.0 - 12.0 %    EOSINOPHILS 1 0.5 - 7.8 %    BASOPHILS 0 0.0 - 2.0 %    IMMATURE GRANULOCYTES 0 0.0 - 5.0 %    ABS. NEUTROPHILS 10.1 (H) 1.7 - 8.2 K/UL    ABS. LYMPHOCYTES 2.8 0.5 - 4.6 K/UL    ABS. MONOCYTES 1.6 (H) 0.1 - 1.3 K/UL    ABS. EOSINOPHILS 0.2 0.0 - 0.8 K/UL    ABS. BASOPHILS 0.0 0.0 - 0.2 K/UL    ABS. IMM.  GRANS. 0.1 0.0 - 0.5 K/UL   MAGNESIUM    Collection Time: 03/22/18  4:49 AM   Result Value Ref Range    Magnesium 2.3 1.8 - 2.4 mg/dL     Allergies   Allergen Reactions    Sulfa (Sulfonamide Antibiotics) Itching     Current Facility-Administered Medications   Medication Dose Route Frequency Provider Last Rate Last Dose    levalbuterol (XOPENEX) nebulizer soln 0.63 mg/3 mL  0.63 mg Nebulization Q4H PRN Romero Miranda NP   0.63 mg at 03/22/18 0520    [START ON 3/23/2018] clopidogrel (PLAVIX) tablet 75 mg  75 mg Oral DAILY Hua Palencia MD        metoprolol succinate (TOPROL-XL) XL tablet 50 mg  50 mg Oral DAILY Hua Palencia MD   50 mg at 03/22/18 1040    sodium chloride (NS) flush 5-10 mL  5-10 mL IntraVENous PRN Romero Miranda NP        ondansetron [facility]) injection 4 mg  4 mg IntraVENous Q4H PRN Romero Miranda NP   4 mg at 03/21/18 0743    alcohol 62% (NOZIN) nasal  1 Ampule 1 Ampule Topical Q12H Sarwat Timmons MD   1 Ampule at 18 2903    warfarin (COUMADIN) tablet 5 mg  5 mg Oral QPM Diomedes Alfaro MD   5 mg at 18 1801    enoxaparin (LOVENOX) injection 70 mg  70 mg SubCUTAneous Q12H Diomedes Alfaro MD   70 mg at 18 1371    sodium chloride (NS) flush 5-10 mL  5-10 mL IntraVENous Q8H Diomedes Alfaro MD   5 mL at 18 9735    acetaminophen (TYLENOL) tablet 650 mg  650 mg Oral Q4H PRN Diomedes Alfaro MD        morphine injection 2 mg  2 mg IntraVENous Q4H PRN Diomedes Alfaro MD        nitroglycerin (NITROSTAT) tablet 0.4 mg  0.4 mg SubLINGual Q5MIN PRN Diomedes Alfaro MD        aspirin chewable tablet 81 mg  81 mg Oral DAILY Diomedes Alfaro MD   81 mg at 18 3331    LORazepam (ATIVAN) tablet 1 mg  1 mg Oral Q6H PRN Diomedes Alfaro MD   1 mg at 18 1801    HYDROcodone-acetaminophen (NORCO) 5-325 mg per tablet 1 Tab  1 Tab Oral Q4H PRN Diomedes Alfaro MD        atorvastatin (LIPITOR) tablet 80 mg  80 mg Oral QHS Diomedes Alfaro MD   80 mg at 18 2300    LORazepam (ATIVAN) tablet 0.5 mg  0.5 mg Oral Q6H PRN Jan Palmer NP        alum-mag hydroxide-simeth (MYLANTA) oral suspension 30 mL  30 mL Oral Q4H PRN Hadley Rahman NP           Review of Systems negative with exception of pertinent positives noted above  PHYSICAL EXAM     Visit Vitals    /77    Pulse 71    Temp 98.6 °F (37 °C)    Resp 21    Ht 5' 2\" (1.575 m)    Wt 70.3 kg (154 lb 14.4 oz)    SpO2 94%    BMI 28.33 kg/m2      Temp (24hrs), Av.9 °F (36.6 °C), Min:97 °F (36.1 °C), Max:98.6 °F (37 °C)    Oxygen Therapy  O2 Sat (%): 94 % (18 1042)  Pulse via Oximetry: 65 beats per minute (18 0522)  O2 Device: Room air (18 1042)  O2 Flow Rate (L/min): 1 l/min (18 0946)  O2 Temperature: 35.6 °F (2 °C) (18 1634)    Intake/Output Summary (Last 24 hours) at 18 1058  Last data filed at 18 0947 Gross per 24 hour   Intake              120 ml   Output              350 ml   Net             -230 ml      General: No acute distress, drowsy    Lungs: CTA bilaterally. Resp even and nonlabored  Heart:  S1S2 present without murmurs rubs gallops. RRR. No edema. Paced rhythm on cardiac monitor  Abdomen: Soft, non tender, non distended. BS present  Extremities: Moves ext spontaneously. No cyanosis  Neurologic:  Drowsy, but oriented X4. No focal deficits  Psych: Calm, appropriate for situation    Results summary of Diagnostic Studies/Procedures copied from within [facility] EMR:         Alice Larios    Diagnosis Date Noted    NSTEMI (non-ST elevated myocardial infarction) ([facility] 03/21/2018    Coronary artery disease involving native coronary artery of native heart 03/21/2018 03/2018:  3.5x26 [place] mRCA      Essential hypertension 03/21/2018    Dyslipidemia 03/21/2018    Hypertrophic obstructive cardiomyopathy ([facility] 03/20/2018    [facility] fibrillation with [facility] 03/20/2018    COPD (chronic obstructive pulmonary disease) ([facility] 07/12/2013    Tobacco use disorder 07/12/2013     Smoking current       Plan:    NSTEMI:  As per Cardiology. S/p PCI 3/20/18    Polypharmacy: UDS + meth, methadone, benzos. Admits to buying all drugs off the street. Continue prn ativan for withdrawals. May consider clonidine for methadone withdrawal but currently appears ativan is sufficient. Cardiology DC sotalol and started metoprolol given concern for prolonged QT.   Resources given for cessation program.      Afib: per Cardiology, on metoprolol and coumadin    Notes, labs, VS, diagnostic testing reviewed  Time spent with pt 20 min    DVT Prophylaxis: lovenox  Plan of Care Discussed with: Supervising MD Dr. Steven Jorgensen, care team, pt   Brenda Arteaga, TARA

## 2018-03-22 NOTE — PROGRESS NOTES
[facility] CARDIOLOGY PROGRESS NOTE           3/22/2018 9:19 AM    Admit Date: 3/20/2018      Subjective:   Patient now admits to chronic polysubstance abuse and outpatient methadone therapy. Remain in AV paced rhythm. No CP.      ROS:  Cardiovascular:  As noted above    Objective:      Vitals:    03/22/18 0450 03/22/18 0500 03/22/18 0522 03/22/18 0557   BP:    124/78   Pulse:    65   Resp:    18   Temp:    98.3 °F (36.8 °C)   SpO2: 90% 95% 93% 93%   Weight:    70.3 kg (154 lb 14.4 oz)   Height:           Physical Exam:  General-No Acute Distress  Neck- supple, no JVD  CV- regular rate and rhythm no MRG  Lung- clear bilaterally  Abd- soft, nontender, nondistended  Ext- no edema bilaterally. Skin- warm and dry    Data Review:   Recent Labs      03/22/18   0449  03/21/18   1727  03/21/18   0542  03/21/18   0146   NA  135*   --   139   --    K  3.5   --   4.1   --    MG  2.3  1.7*   --    --    BUN  23   --   19   --    CREA  1.01*   --   0.91   --    GLU  100   --   150*   --    WBC  14.9*   --   11.3*   --    HGB  12.5   --   13.7   --    HCT  40.3   --   45.0   --    PLT  339   --   330   --    INR  1.5   --    --    --    TROIQ   --    --   17.80*  18.10*   CHOL   --    --   105   --    LDLC   --    --   66.8   --    HDL   --    --   20*   --        Assessment/Plan:     Principal Problem:    NSTEMI (non-ST elevated myocardial infarction) ([facility] (3/21/2018)    S/P PCI of the mRCA and change Brilinta to clopidogrel. DC ASA and remains on warfarin and lovenox for AF.   EF is normal.      Active Problems:    Tobacco use disorder (7/12/2013)    Cessation education      COPD (chronic obstructive pulmonary disease) ([facility] (7/12/2013)    Due to tobacco abuse and morbid obesity      Hypertrophic obstructive cardiomyopathy ([facility] (3/20/2018)    This is s/p septal ablation and no LVOT gradient       Atrial fibrillation with rapid ventricular response ([address] (3/20/2018)    Now in SR and will DC sotalol due to methadone use. Will try metoprolol succinate as she already has some QT prolongation. Will CAD and HOCM she is not a candidate for any other therapies. Hospitalists are trying to assist with withdrawal issues. If she remains clean could consider therpies in future      Coronary artery disease involving native coronary artery of native heart (3/21/2018)    S/P PCI - change to clopidogrel      Essential hypertension (3/21/2018)    This is stable on therapy      Dyslipidemia (3/21/2018)    On atorvastatin        Mitral stenosis/regurgitation     Moderate and medical therapy appropriate. Using warfarin for AF due to valvular heart disease.             Dianna Kinsey MD  3/22/2018 9:19 AM

## 2018-03-22 NOTE — PROGRESS NOTES
Bedside and Verbal shift change report given to Estrella Mitchell RN and Nate James RN (oncoming nurse) by self [facility] nurse). Report included the following information SBAR, Kardex, MAR and Recent Results.

## 2018-03-22 NOTE — PROGRESS NOTES
Problem: Falls - Risk of  Goal: *Absence of Falls  Document Abi Fall Risk and appropriate interventions in the flowsheet. Outcome: Progressing Towards Goal    Fall Risk Interventions:  Mobility Interventions: Bed/chair exit alarm    Mentation Interventions: Bed/chair exit alarm    Medication Interventions: Bed/chair exit alarm    Elimination Interventions: Bed/chair exit alarm    History of Falls Interventions: Door open when patient unattended        Problem: Afib Pathway: Day 1  Goal: Medications  Outcome: Resolved/Met Date Met: 03/21/18    New medications information sheets provided. Questions answered. Currently beginning a SOTALOL load. Goal: Psychosocial  Outcome: Progressing Towards Goal  Issues with anxiety noted. Given Ativan during daytime hours. Responds well to therapeutic communication. Goal: *Optimal pain control at patient's stated goal  Outcome: Resolved/Met Date Met: 03/21/18  Denies pain or SOB      Goal: *Hemodynamically stable  Outcome: Resolved/Met Date Met: 03/21/18  Patient admitted with Afib c RVR. Currently patient is paced rhythm. BP stable      Goal: *Stable cardiac rhythm  Outcome: Resolved/Met Date Met: 03/21/18  Paced on monitor      Goal: *Labs within defined limits  Outcome: Resolved/Met Date Met: 03/21/18  Troponin elevated on admission. Patient underwent heart cath procedure with PCI today      Goal: *Describes available resources and support systems  Outcome: Resolved/Met Date Met: 03/21/18  Multiple social issues at home. Multiple family members at bedside. Patient is in progress of moving out of residence in which she resided with a boyfriend. Problem: Cath Lab Procedures: Post-Cath Day of Procedure (Initiate SCIP Measures for Post-Op Care)  Goal: *Procedure site is without bleeding and signs of infection six hours post sheath removal  Outcome: Resolved/Met Date Met: 03/21/18  Right radial site WDL with bruising noted.

## 2018-03-22 NOTE — PROGRESS NOTES
During reassessment, Lung Sounds noted to have new onset wheezing, inspiratory and expiratory. Course/diminished. NP Marco Rivers made aware. New orders received. Will continue to monitor.

## 2018-03-22 NOTE — PROGRESS NOTES
Patient assisted OOB to bathroom. Very anxious. Pulling at gown. Hyperventilating and arms trembling. Therapeutic communication to calm patient down before ambulating to bathroom. Patient became calm prior to ambulation. Patient states she gets SOB like this at home and when does she uses \"meth. \"  Patient states she last used yesterday.       Discussed Hospitalist consult with Dr Humberto Jeronimo - ordered by Young Reddy NP for polysubstance abuse withdrawal.

## 2018-03-22 NOTE — PROGRESS NOTES
Bedside and Verbal shift change report given to Yinka Abdi RN (oncoming nurse) by Self (offgoing nurse). Report included the following information SBAR, Kardex and Recent Results.

## 2018-03-23 VITALS
BODY MASS INDEX: 28.65 KG/M2 | WEIGHT: 155.7 LBS | HEIGHT: 62 IN | HEART RATE: 70 BPM | TEMPERATURE: 98 F | DIASTOLIC BLOOD PRESSURE: 80 MMHG | RESPIRATION RATE: 20 BRPM | SYSTOLIC BLOOD PRESSURE: 123 MMHG | OXYGEN SATURATION: 99 %

## 2018-03-23 LAB
ANION GAP SERPL CALC-SCNC: 10 MMOL/L (ref 7–16)
BASOPHILS # BLD: 0 K/UL (ref 0–0.2)
BASOPHILS NFR BLD: 0 % (ref 0–2)
BUN SERPL-MCNC: 17 MG/DL (ref 6–23)
CALCIUM SERPL-MCNC: 8.6 MG/DL (ref 8.3–10.4)
CHLORIDE SERPL-SCNC: 103 MMOL/L (ref 98–107)
CO2 SERPL-SCNC: 27 MMOL/L (ref 21–32)
CREAT SERPL-MCNC: 0.88 MG/DL (ref 0.6–1)
DIFFERENTIAL METHOD BLD: ABNORMAL
EOSINOPHIL # BLD: 0.2 K/UL (ref 0–0.8)
EOSINOPHIL NFR BLD: 2 % (ref 0.5–7.8)
ERYTHROCYTE [DISTWIDTH] IN BLOOD BY AUTOMATED COUNT: 15 % (ref 11.9–14.6)
GLUCOSE SERPL-MCNC: 74 MG/DL (ref 65–100)
HCT VFR BLD AUTO: 39.4 % (ref 35.8–46.3)
HGB BLD-MCNC: 12 G/DL (ref 11.7–15.4)
IMM GRANULOCYTES # BLD: 0.1 K/UL (ref 0–0.5)
IMM GRANULOCYTES NFR BLD AUTO: 1 % (ref 0–5)
INR PPP: 1.6
LYMPHOCYTES # BLD: 2 K/UL (ref 0.5–4.6)
LYMPHOCYTES NFR BLD: 16 % (ref 13–44)
MAGNESIUM SERPL-MCNC: 1.9 MG/DL (ref 1.8–2.4)
MCH RBC QN AUTO: 24.6 PG (ref 26.1–32.9)
MCHC RBC AUTO-ENTMCNC: 30.5 G/DL (ref 31.4–35)
MCV RBC AUTO: 80.7 FL (ref 79.6–97.8)
MONOCYTES # BLD: 1.3 K/UL (ref 0.1–1.3)
MONOCYTES NFR BLD: 11 % (ref 4–12)
NEUTS SEG # BLD: 8.5 K/UL (ref 1.7–8.2)
NEUTS SEG NFR BLD: 70 % (ref 43–78)
PLATELET # BLD AUTO: 248 K/UL (ref 150–450)
PMV BLD AUTO: 10.7 FL (ref 10.8–14.1)
POTASSIUM SERPL-SCNC: 3.7 MMOL/L (ref 3.5–5.1)
PROTHROMBIN TIME: 18.2 SEC (ref 11.5–14.5)
RBC # BLD AUTO: 4.88 M/UL (ref 4.05–5.25)
SODIUM SERPL-SCNC: 140 MMOL/L (ref 136–145)
WBC # BLD AUTO: 12.1 K/UL (ref 4.3–11.1)

## 2018-03-23 PROCEDURE — 74011250637 HC RX REV CODE- 250/637: Performed by: INTERNAL MEDICINE

## 2018-03-23 PROCEDURE — 80048 BASIC METABOLIC PNL TOTAL CA: CPT | Performed by: INTERNAL MEDICINE

## 2018-03-23 PROCEDURE — 36415 COLL VENOUS BLD VENIPUNCTURE: CPT | Performed by: INTERNAL MEDICINE

## 2018-03-23 PROCEDURE — 83735 ASSAY OF MAGNESIUM: CPT | Performed by: INTERNAL MEDICINE

## 2018-03-23 PROCEDURE — 74011250636 HC RX REV CODE- 250/636: Performed by: INTERNAL MEDICINE

## 2018-03-23 PROCEDURE — 85610 PROTHROMBIN TIME: CPT | Performed by: INTERNAL MEDICINE

## 2018-03-23 PROCEDURE — 85025 COMPLETE CBC W/AUTO DIFF WBC: CPT | Performed by: INTERNAL MEDICINE

## 2018-03-23 RX ORDER — CLOPIDOGREL BISULFATE 75 MG/1
75 TABLET ORAL DAILY
Qty: 30 TAB | Refills: 11 | Status: SHIPPED | OUTPATIENT
Start: 2018-03-23 | End: 2018-03-23

## 2018-03-23 RX ORDER — ALBUTEROL SULFATE 90 UG/1
2 AEROSOL, METERED RESPIRATORY (INHALATION)
Qty: 1 INHALER | Refills: 1 | Status: SHIPPED | OUTPATIENT
Start: 2018-03-23

## 2018-03-23 RX ORDER — BUSPIRONE HYDROCHLORIDE 10 MG/1
10 TABLET ORAL
Qty: 90 TAB | Refills: 0 | Status: SHIPPED | OUTPATIENT
Start: 2018-03-23 | End: 2018-03-23

## 2018-03-23 RX ORDER — BUSPIRONE HYDROCHLORIDE 10 MG/1
10 TABLET ORAL
Qty: 90 TAB | Refills: 0 | Status: SHIPPED | OUTPATIENT
Start: 2018-03-23 | End: 2018-08-02

## 2018-03-23 RX ORDER — ATORVASTATIN CALCIUM 80 MG/1
80 TABLET, FILM COATED ORAL
Qty: 30 TAB | Refills: 11 | Status: SHIPPED | OUTPATIENT
Start: 2018-03-23 | End: 2018-03-23

## 2018-03-23 RX ORDER — NITROGLYCERIN 0.4 MG/1
0.4 TABLET SUBLINGUAL
Qty: 1 BOTTLE | Refills: 11 | Status: SHIPPED | OUTPATIENT
Start: 2018-03-23 | End: 2018-03-23

## 2018-03-23 RX ORDER — METOPROLOL SUCCINATE 50 MG/1
50 TABLET, EXTENDED RELEASE ORAL DAILY
Qty: 30 TAB | Refills: 11 | Status: SHIPPED | OUTPATIENT
Start: 2018-03-23 | End: 2018-03-23

## 2018-03-23 RX ORDER — METOPROLOL SUCCINATE 50 MG/1
50 TABLET, EXTENDED RELEASE ORAL DAILY
Qty: 30 TAB | Refills: 11 | Status: SHIPPED | OUTPATIENT
Start: 2018-03-23 | End: 2018-06-01

## 2018-03-23 RX ORDER — ATORVASTATIN CALCIUM 80 MG/1
80 TABLET, FILM COATED ORAL
Qty: 30 TAB | Refills: 11 | Status: SHIPPED | OUTPATIENT
Start: 2018-03-23

## 2018-03-23 RX ORDER — CLOPIDOGREL BISULFATE 75 MG/1
75 TABLET ORAL DAILY
Qty: 30 TAB | Refills: 11 | Status: SHIPPED | OUTPATIENT
Start: 2018-03-23

## 2018-03-23 RX ORDER — NITROGLYCERIN 0.4 MG/1
0.4 TABLET SUBLINGUAL
Qty: 1 BOTTLE | Refills: 11 | Status: SHIPPED | OUTPATIENT
Start: 2018-03-23

## 2018-03-23 RX ORDER — ALBUTEROL SULFATE 90 UG/1
2 AEROSOL, METERED RESPIRATORY (INHALATION)
Qty: 1 INHALER | Refills: 1 | Status: SHIPPED | OUTPATIENT
Start: 2018-03-23 | End: 2018-03-23

## 2018-03-23 RX ADMIN — Medication 1 AMPULE: at 09:03

## 2018-03-23 RX ADMIN — METOPROLOL SUCCINATE 50 MG: 50 TABLET, EXTENDED RELEASE ORAL at 09:03

## 2018-03-23 RX ADMIN — Medication 5 ML: at 06:05

## 2018-03-23 RX ADMIN — ENOXAPARIN SODIUM 70 MG: 80 INJECTION SUBCUTANEOUS at 05:55

## 2018-03-23 RX ADMIN — CLOPIDOGREL BISULFATE 75 MG: 75 TABLET ORAL at 09:03

## 2018-03-23 NOTE — DISCHARGE SUMMARY
[address] [facility] [address] Cardiology Discharge Summary     Patient ID:  Kvng [id number]  [id number]  66 y.o.  1965    Admit date: 3/20/2018    Discharge date:  3/23/2018    Admitting Physician: Wayne Joseph MD     Discharge Physician: LACY Figueroa/Dr. Chanda Coronado    Admission Diagnoses: Atrial fibrillation with RVR [facility])    Discharge Diagnoses:    Diagnosis    NSTEMI (non-ST elevated myocardial infarction) [facility])    Coronary artery disease involving native coronary artery of native heart    Essential hypertension    Dyslipidemia    Hypertrophic obstructive cardiomyopathy (HCC)    Elevated troponin    Atrial fibrillation with rapid ventricular response ([address]    Family history of hypertrophic cardiomyopathy    Chest pain at rest    Obesity    Anxiety    Tobacco use disorder    Depression    Cardiomyopathy ([address]    COPD (chronic obstructive pulmonary disease) ([facility]    Sleep disorder    Debility    Toxic effect of tobacco(989.84)     Transitional care follow up with [address] [address] Dr. Jose Negron April 12 at [address] [facility] office     Cardiology Procedures this admission:  Left heart catheterization with PCI, echo   Consults: [facility] Course: Patient presented to the emergency department of [facility] with complaints of chest tightness w h/o HOCM s/p ICD. She was found to be in a fib w RVR. She has a h/o polysubstance abuse. Patient was evaluated and subsequently admitted for further cardiac evaluation and treatment. Cardiac enzymes were elevated at 20.7. She was started on Cardizem drip and heparin. [facility] was planned for 3-21-18. Patient underwent cardiac catheterization by Dr. Starr Mills and was found to have a 80% stenosis of the RCA that was stented with a 3.5 x 26 [place] drug-eluting stent with 0% residual stenosis. Patient tolerated the procedure well and returned to the telemetry floor for recovery. She was started on sotalol and coumadin.   An echocardiogram was performed with report as follows:     -  Left ventricle: Systolic function was at the lower limits of normal.  Ejection fraction was estimated in the range of 50 % to 55 %. There was  akinesis of the basal inferoseptal and basal inferior wall(s). There was  moderate concentric hypertrophy. -  Right ventricle: The ventricle was mildly to moderately dilated. Systolic  function was reduced. -  Atrial septum: Contrast injection was performed. There was no   right-to-left  shunt, with provocative maneuvers to increase right atrial pressure. -  Inferior vena cava, hepatic veins: The respirophasic change in diameter   was  less than 50%. -  Mitral valve: The pressure half-time was 132 ms. The valve area by VTI was  0.97 cm2. There was moderate stenosis. There was moderate to severe  regurgitation. -  Tricuspid valve: There was moderate to severe regurgitation. -  Pericardium: A trivial pericardial effusion was identified circumferential  to the heart. UDS positive for multiple substances. With methadone use it was not felt that she was a good candidate for sotalol with risk of QTc prolongation and sotalol stopped, toprol started. She states she had not used cocaine recently and understands the risk of cocaine and BB use. Hospitalist was consulted to manage withdrawal.  She was seen by social work and given resources to assist with illegal drug abstinence. Pt converted to NSR. The morning of 3/23/2018 patient was up feeling well without any complaints of chest pain or shortness of breath. Patient's right radial cath site was clean, dry and intact without hematoma or bruit. Patient's labs were WNL. Patient was seen and examined by Dr. Kizzy Rodriguez and determined stable and ready for discharge. Dr Kizzy Rodriguez adjusted her meds to manage NSTEMI and a fib. Patient was instructed on the importance of medication compliance including taking xarelto and plavix everyday without missing a dose.  Indication for treatment and risk of dual antiplatelet therapy was discussed with the patient and she understands to seek medical care immediately if any signs of bleeding.   For maximized medical therapy of CAD, patient will continue use of BB and statin but no ACE/ARB due to hypotension. She was again encouraged to stop all illegal drug use. The patient will have close transitional care follow up with [facility] Dr. Zelalem Moser April 12 at [facility] office and has been referred to cardiac rehab. She was given a limited script of buspar for anxiety. She is to obtain a PCP for further management of anxiety. DISPOSITION: The patient is being discharged home in stable condition on a low saturated fat, low cholesterol and low salt diet. The patient is instructed to advance activities as tolerated to the limit of fatigue or shortness of breath. The patient is instructed to avoid all heavy lifting for 5 days. The patient is instructed to watch the cath site for bleeding/oozing; if seen, the patient is instructed to apply firm pressure with a clean cloth and call [facility] at [phone]. The patient is instructed to watch for signs of infection which include: increasing area of redness, fever/hot to touch or purulent drainage at the catheterization site. The patient is instructed not to soak in a bathtub for 7-10 days, but is cleared to shower. The patient is instructed to call the office or return to the ER for immediate evaluation for any shortness of breath or chest pain not relieved by NTG. Discharge Exam:   Visit Vitals    /80 (BP 1 Location: Right arm, BP Patient Position: Sitting)    Pulse 70    Temp 98 °F (36.7 °C)    Resp 20    Ht 5' 2\" (1.575 m)    Wt 70.6 kg (155 lb 11.2 oz)    SpO2 93%    BMI 28.48 kg/m2     Patient has been seen by Dr. Memo Rogers: see his progress note for exam details.     Recent Results (from the past 24 hour(s))   PROTHROMBIN TIME + INR    Collection Time: 03/23/18  3:31 AM   Result Value Ref Range    Prothrombin time 18.2 (H) 11.5 - 14.5 sec    INR 1.6     METABOLIC PANEL, BASIC    Collection Time: 03/23/18  3:31 AM   Result Value Ref Range    Sodium 140 136 - 145 mmol/L    Potassium 3.7 3.5 - 5.1 mmol/L    Chloride 103 98 - 107 mmol/L    CO2 27 21 - 32 mmol/L    Anion gap 10 7 - 16 mmol/L    Glucose 74 65 - 100 mg/dL    BUN 17 6 - 23 MG/DL    Creatinine 0.88 0.6 - 1.0 MG/DL    GFR est AA >60 >60 ml/min/1.73m2    GFR est non-AA >60 >60 ml/min/1.73m2    Calcium 8.6 8.3 - 10.4 MG/DL   CBC WITH AUTOMATED DIFF    Collection Time: 03/23/18  3:31 AM   Result Value Ref Range    WBC 12.1 (H) 4.3 - 11.1 K/uL    RBC 4.88 4.05 - 5.25 M/uL    HGB 12.0 11.7 - 15.4 g/dL    HCT 39.4 35.8 - 46.3 %    MCV 80.7 79.6 - 97.8 FL    MCH 24.6 (L) 26.1 - 32.9 PG    MCHC 30.5 (L) 31.4 - 35.0 g/dL    RDW 15.0 (H) 11.9 - 14.6 %    PLATELET 493 143 - 465 K/uL    MPV 10.7 (L) 10.8 - 14.1 FL    DF AUTOMATED      NEUTROPHILS 70 43 - 78 %    LYMPHOCYTES 16 13 - 44 %    MONOCYTES 11 4.0 - 12.0 %    EOSINOPHILS 2 0.5 - 7.8 %    BASOPHILS 0 0.0 - 2.0 %    IMMATURE GRANULOCYTES 1 0.0 - 5.0 %    ABS. NEUTROPHILS 8.5 (H) 1.7 - 8.2 K/UL    ABS. LYMPHOCYTES 2.0 0.5 - 4.6 K/UL    ABS. MONOCYTES 1.3 0.1 - 1.3 K/UL    ABS. EOSINOPHILS 0.2 0.0 - 0.8 K/UL    ABS. BASOPHILS 0.0 0.0 - 0.2 K/UL    ABS. IMM. GRANS. 0.1 0.0 - 0.5 K/UL   MAGNESIUM    Collection Time: 03/23/18  3:31 AM   Result Value Ref Range    Magnesium 1.9 1.8 - 2.4 mg/dL         Patient Instructions:   Current Discharge Medication List      START taking these medications    Details   atorvastatin (LIPITOR) 80 mg tablet Take 1 Tab by mouth nightly. Qty: 30 Tab, Refills: 11      metoprolol succinate (TOPROL-XL) 50 mg XL tablet Take 1 Tab by mouth daily. Qty: 30 Tab, Refills: 11      rivaroxaban (XARELTO) 15 mg tab tablet Take 1 Tab by mouth daily (with dinner). Qty: 30 Tab, Refills: 11      clopidogrel (PLAVIX) 75 mg tab Take 1 Tab by mouth daily.   Qty: 30 Tab, Refills: 11      nitroglycerin (NITROSTAT) 0.4 mg SL tablet 1 Tab by SubLINGual route every five (5) minutes as needed for Chest Pain. Qty: 1 Bottle, Refills: 11      albuterol (PROVENTIL HFA, VENTOLIN HFA, PROAIR HFA) 90 mcg/actuation inhaler Take 2 Puffs by inhalation every six (6) hours as needed for Wheezing. Qty: 1 Inhaler, Refills: 1      busPIRone (BUSPAR) 10 mg tablet Take 1 Tab by mouth three (3) times daily (with meals).   Qty: 90 Tab, Refills: 0         STOP taking these medications       furosemide (LASIX) 20 mg tablet Comments:   Reason for Stopping:                 Signed:  Pily Walton PA-C  3/23/2018  9:09 AM

## 2018-03-23 NOTE — DISCHARGE INSTRUCTIONS
Cardiac Catheterization/Angiography Discharge Instructions    *Check the puncture site frequently for swelling or bleeding. If you see any bleeding, lie down and apply pressure over the area with a clean town or washcloth. Notify your doctor for any redness, swelling, drainage or oozing from the puncture site. Notify your doctor for any fever or chills. *If the leg or arm with the puncture becomes cold, numb or painful, call [facility]iology at  [phone]    *Activity should be limited for the next 48 hours. Climb stairs as little as possible and avoid any stooping, bending or strenuous activity for 48 hours. No heavy lifting (anything over 10 pounds) for three days. *Do not drive for 48 hours. *You may resume your usual diet. Drink more fluids than usual.    *Have a responsible person drive you home and stay with you for at least 24 hours after your heart catheterization/angiography. *You may remove the bandage from your Right and Arm in 24 hours. You may shower in 24 hours. No tub baths, hot tubs or swimming for one week. Do not place any lotions, creams, powders, ointments over the puncture site for one week. You may place a clean band-aid over the puncture site each day for 5 days. Change this daily. Percutaneous Coronary Intervention: What to Expect at [facility]    Percutaneous coronary intervention (PCI) is the name for procedures that are used to open a narrowed or blocked coronary artery. The two most common PCI procedures are coronary angioplasty and coronary stent placement. Your groin or arm may have a bruise and feel sore for a day or two after a percutaneous coronary intervention (PCI). You can do light activities around the house, but nothing strenuous for several days. This care sheet gives you a general idea about how long it will take for you to recover. But each person recovers at a different pace.  Follow the steps below to get better as quickly as possible. How can you care for yourself at home? Activity  ? · If the doctor gave you a sedative:  ¨ For 24 hours, don't do anything that requires attention to detail. It takes time for the medicine's effects to completely wear off. ¨ For your safety, do not drive or operate any machinery that could be dangerous. Wait until the medicine wears off and you can think clearly and react easily. ? · Do not do strenuous exercise and do not lift, pull, or push anything heavy until your doctor says it is okay. This may be for a day or two. You can walk around the house and do light activity, such as cooking. ? · If the catheter was placed in your groin, try not to walk up stairs for the first couple of days. ? · If the catheter was placed in your arm near your wrist, do not bend your wrist deeply for the first couple of days. Be careful using your hand to get into and out of a chair or bed. ? · Carry your stent identification card with you at all times. ? · If your doctor recommends it, get more exercise. Walking is a good choice. Bit by bit, increase the amount you walk every day. Try for at least 30 minutes on most days of the week. Diet  ? · Drink plenty of fluids to help your body flush out the dye. If you have kidney, heart, or liver disease and have to limit fluids, talk with your doctor before you increase the amount of fluids you drink. ? · Keep eating a heart-healthy diet that has lots of fruits, vegetables, and whole grains. If you have not been eating this way, talk to your doctor. You also may want to talk to a dietitian. This expert can help you to learn about healthy foods and plan meals. Medicines  ? · Your doctor will tell you if and when you can restart your medicines. He or she will also give you instructions about taking any new medicines. ? · If you take blood thinners, such as warfarin (Coumadin), clopidogrel (Plavix), or aspirin, be sure to talk to your doctor.  He or she will tell you if and when to start taking those medicines again. Make sure that you understand exactly what your doctor wants you to do.   ? · Your doctor will prescribe blood-thinning medicines. You will likely take aspirin plus another antiplatelet, such as clopidogrel (Plavix). It is very important that you take these medicines exactly as directed. These medicines help keep the coronary artery open and reduce your risk of a heart attack. ? · Call your doctor if you think you are having a problem with your medicine. ?Care of the catheter site  ? · For 1 or 2 days, keep a bandage over the spot where the catheter was inserted. The bandage probably will fall off in this time. ? · Put ice or a cold pack on the area for 10 to 20 minutes at a time to help with soreness or swelling. Put a thin cloth between the ice and your skin. ? · You may shower 24 to 48 hours after the procedure, if your doctor okays it. Pat the incision dry. ? · Do not soak the catheter site until it is healed. Don't take a bath for 1 week, or until your doctor tells you it isokay. Follow-up care is a key part of your treatment and safety. Be sure to make and go to all appointments, and call your doctor if you are having problems. It's also a good idea to know your test results and keep a list of the medicines you take. When should you call for help? Call 911 anytime you think you may need emergency care. For example, call if:  ? · You passed out (lost consciousness). ? · You have severe trouble breathing. ? · You have sudden chest pain and shortness of breath, or you cough up blood. ? · You have symptoms of a heart attack, such as:  ¨ Chest pain or pressure. ¨ Sweating. ¨ Shortness of breath. ¨ Nausea or vomiting. ¨ Pain that spreads from the chest to the neck, jaw, or one or both shoulders or arms. ¨ Dizziness or lightheadedness. ¨ A fast or uneven pulse. After calling 911, chew 1 adult-strength aspirin.  Wait for an ambulance. Do not try to drive yourself. ? · You have been diagnosed with angina, and you have angina symptoms that do not go away with rest or are not getting better within 5 minutes after you take one dose of nitroglycerin. ?Call your doctor now or seek immediate medical care if:  ? · You are bleeding from the area where the catheter was put in your artery. ? · You have a fast-growing, painful lump at the catheter site. ? · You have signs of infection, such as:  ¨ Increased pain, swelling, warmth, or redness. ¨ Red streaks leading from the catheter site. ¨ Pus draining from the catheter site. ¨ A fever. ? · Your leg or arm looks blue or feels cold, numb, or tingly. ? Watch closely for changes in your health, and be sure to contact your doctor if you have any problems. Where can you learn more? Go to http://juanita-mj.info/. Enter U247 in the search box to learn more about \"Percutaneous Coronary Intervention: What to Expect at Home. \"  Current as of: September 21, 2016  Content Version: 11.4  © 1840-3634 [employer]. Care instructions adapted under license by [employer] (which disclaims liability or warranty for this information). If you have questions about a medical condition or this instruction, always ask your healthcare professional. [place] [address] any warranty or liability for your use of this information.

## 2018-03-23 NOTE — PROGRESS NOTES
Patient admitted to cocaine use but not in recent months. Patient started Lopressor yesterday. Educated regarding effects of Lopressor and cocaine. Voiced understanding. Dr Velazco made aware.

## 2018-03-23 NOTE — PROGRESS NOTES
Care Management Interventions  PCP Verified by CM: Yes (Patient states is going to get PCP as she no longer sees Dr. Leticia Irby)  Mode of Transport at Discharge: Other (see comment) (friend)  Transition of Care Consult (CM Consult): Discharge Planning  Current Support Network: Lives Alone (she is going to stay with a friend until has an apartment across from her daughter)  Confirm Follow Up Transport: Friends  Plan discussed with Pt/Family/Caregiver: Yes  Freedom of Choice Offered: Yes  Discharge Location  Discharge Placement: Home  Met with patient for d/c planning earlier today. She is alert and oriented x 3, independent of ADL's and lives alone but will be going to stay with her friend until she finds a new apartment across from her daughter. Patient has prescription plan and goes to [facility] for her medications. She requires no DME. Discussed with patient about her substance abuse and she admits to meth and states thought would help but it really didn't. Has been to rehab for drugs at [facility] last year and did like that but does not wish to go to any rehab or information about the rehabs. She states she is aware of the options for her drug use. Patient to be d/c home with a friend.

## 2018-03-23 NOTE — PROGRESS NOTES
Problem: Falls - Risk of  Goal: *Absence of Falls  Document Abi Fall Risk and appropriate interventions in the flowsheet. Outcome: Progressing Towards Goal  Fall Risk Interventions:  Mobility Interventions: Bed/chair exit alarm    Mentation Interventions: Bed/chair exit alarm    Medication Interventions: Patient to call before getting OOB    Elimination Interventions: Bed/chair exit alarm    History of Falls Interventions: Door open when patient unattended        Problem: Afib Pathway: Day 3  Goal: Activity/Safety  Outcome: Progressing Towards Goal  Patient is exhibiting less dyspnea with exertion. Goal: Psychosocial  Outcome: Progressing Towards Goal  Patient is still having episodes of acute anxiety but is learning to develop non pharmacological coping mechanisms to work through the episodes. Problem: Afib: Discharge Outcomes (not in CAT)  Goal: *Stable cardiac rhythm  Outcome: Progressing Towards Goal  Patient is having less arrythmias.

## 2018-03-23 NOTE — PROGRESS NOTES
Bedside and Verbal shift change report given to Abraham Aldridge (oncoming nurse) by self (offgoing nurse). Report included the following information SBAR, Kardex and Recent Results.

## 2018-03-23 NOTE — PROGRESS NOTES
I have reviewed discharge instructions with the patient. The patient verbalized understanding. IV's and monitor removed at discharge. Prescriptions given for see below:    START taking these medications     Details   atorvastatin (LIPITOR) 80 mg tablet Take 1 Tab by mouth nightly. Qty: 30 Tab, Refills: 11       metoprolol succinate (TOPROL-XL) 50 mg XL tablet Take 1 Tab by mouth daily. Qty: 30 Tab, Refills: 11       rivaroxaban (XARELTO) 15 mg tab tablet Take 1 Tab by mouth daily (with dinner). Qty: 30 Tab, Refills: 11       clopidogrel (PLAVIX) 75 mg tab Take 1 Tab by mouth daily. Qty: 30 Tab, Refills: 11       nitroglycerin (NITROSTAT) 0.4 mg SL tablet 1 Tab by SubLINGual route every five (5) minutes as needed for Chest Pain. Qty: 1 Bottle, Refills: 11       albuterol (PROVENTIL HFA, VENTOLIN HFA, PROAIR HFA) 90 mcg/actuation inhaler Take 2 Puffs by inhalation every six (6) hours as needed for Wheezing. Qty: 1 Inhaler, Refills: 1       busPIRone (BUSPAR) 10 mg tablet Take 1 Tab by mouth three (3) times daily (with meals).   Qty: 90 Tab, Refills: 0

## 2018-03-23 NOTE — PROGRESS NOTES
Pt seen and examined. No cp or inc sob. On exam awake and alert. + dyspnea. Cor regular. No edema. Labs stable  Imp:  Nstemi, pci  paf  Hocm, hx etoh ablation and icd  Met user  Plan:  Long discussion w/ pt. She is not a warfarin candidate. Will rx per [facility] w/ Plavix 75 and Xarelto 15. Also, bb and statin and inhaler rx.

## 2018-03-29 ENCOUNTER — PATIENT OUTREACH (OUTPATIENT)
Dept: CASE MANAGEMENT | Age: 53
End: 2018-03-29

## 2018-03-30 NOTE — PROGRESS NOTES
Downtime progress note entry for [facility] :  Frankie Apodaca RN    Transition of Care Discharge Follow-up Questionnaire   Date/Time of Call:   Patient name:   :   MRN:   FUAD#   3/29/18 @ 3:58pm  Bertha Calle  1965  [id number]  [id number] 1st call     What was the patient hospitalized for? Hypertropic obstructive cardiomyopathy   Does the patient understand his/her diagnosis and/or treatment and what happened during the hospitalization? Yes   Did the patient receive discharge instructions? Yes     Review any discharge instructions (see notes in [facility]). Ask patient if they understand these. Do they have any questions? 3/29/18 @ 11:17am- Left message on [phone]. [phone] had restrictions and couldn't leave voicemail. 3/29/18 2 3:58pm Called and spoke to member. RN Care  role explained and contact information. [employer] Nurse Hotline contact information given. She is still short of breath. She called cardiologist Dr. Shruti Hinojosa office and they are seeing her tomorrow at 2:30pm.  She stated she has gotten all of her medications filled. Advised her to do her albuterol inhaler, one puff and then wait about a minute then do the 2nd puff so that they medication has time to open up her bronchial tubes to get the 2nd puff of medication deeper into her lungs. She voiced understanding. Does not have a PCP at this time. She states she lives closer to [place] and none of the [facility] are close to her. Offered her the number to [facility] in [place] and explained that they also had a pharmacy in house. She stated she would call them tomorrow to establish a relationship and get an appointment. Her cath site in her arm is without redness or edema. Educated her on the importance of keeping site clean and to hold pressure if it started to bleed and call 911 if the bleeding did not stop after holding pressure. She voiced understanding. [facility] contact number given.  Educated her about no heavy lifting, no bath tubs for 7-10 days due to cath site. She voiced her understanding. Educated her about a low saturated fat, low cholesterol, low salt diet and she voiced understanding. Educated her related to her new medications, no questions or issues at this time. Educated her to call 911 if she had sudden chest pain, shortness of breath that was not relieved by resting, or any other problems or concerns that were cardiac related. She voiced understanding. Were home services ordered (nursing, PT, OT, ST, etc.)? No   If so, has the first visit occurred? If not, why? (Assist with coordination of services if necessary. ) NA     Was any DME ordered? No   If so, has it been received? If not, why?  (Assist with coordination of arranging DME orders if necessary. ) NA     Complete a review of all medications (new, continued and discontinued meds per the D/C instructions and medication tab in [facility]). Lipitor 80mg at night  Toprol XL 50mg daily  Xarelto 15mg daily with dinner  Plavix 75mg daily  Nitrostat 0.4mg sublingual every 5 minutes x 3 doses as needed for chest pain  Albuterol inhaler, 2 puffs every 6hours as needed for wheezing  Buspar 10mg three times a day     Were all new prescriptions filled? If not, why?  (Assist with obtainment of medications if necessary.)   Yes   Does the patient understand the purpose and dosing instructions for all medications? (If patient has questions, provide explanation and education.) Yes   Does the patient have any problems in performing ADLs? (If patient is unable to perform ADLs - what is the limiting factor(s)? Do they have a support system that can assist? If no support system is present, discuss possible assistance that they may be able to obtain.) Just gets tired easily     Does the patient have all follow-up appointments scheduled?       7 day f/up with PCP?    7-14 day f/up with specialist?    If f/up has not been made - what actions has the care coordinator made to accomplish this? Has transportation been arranged? [facility] follow-up should be within 7 days of discharge; all others should have PCP follow-up within 7 days of discharge; follow-ups with other specialists should be within 7-14 days of discharge.) Does not have an established PCP. Gave her a list of the [facility] but she states she lives closer to [facility]. Gave her [facility] phone number and instructed her to establish and get appointment as soon as possible. Cardiology appointment on 3/30/18 with Dr. Shayla Yusuf at 2:30pm  Yes, friend is transporting her   Any other questions or concerns expressed by the patient? Offered her [facility] and gave her the contact number to see if she qualifies   Schedule next appointment with [facility] Coordinator or refer to RN Case Manager/  per the workflow guidelines. When is care coordinators next follow-up call scheduled? If referred for CCM - what RN care manager was the referral assigned?  Week of April 2, 2018   [facility] Call Completed By:   Krystina Villa RN

## 2018-04-02 ENCOUNTER — PATIENT OUTREACH (OUTPATIENT)
Dept: CASE MANAGEMENT | Age: 53
End: 2018-04-02

## 2018-04-04 ENCOUNTER — PATIENT OUTREACH (OUTPATIENT)
Dept: CASE MANAGEMENT | Age: 53
End: 2018-04-04

## 2018-04-04 NOTE — PROGRESS NOTES
Downtime Progress Note for [facility]  Kvng Wheeler RN    Care  Follow up Outreach Note   Outreach type:  Patient name:  :  MRN:  H: 2nd call  Asia Wesley. Pulcine  [id number]  [id number]  [id number]   Date of follow up  . 18 @ 11:55am     Reason for follow-up:   Hypertropic obstructive cardiomyopathy   Disease specific complaints/issues:      Patient progress towards goals set from last contact:   18 @ 11:55am- Called and left voicemail with my contact information. Called to see if she had called [facility] to establish relationship and she had not. Has patient attended any PCP or specialist follow-up appointments since last contact? What was outcome of appointment? When is next follow-up scheduled? Review medications. Any medication changes since last outreach? Does patient have any questions or issues related to their medications? Home health active? If yes - any issue? Progress? Referrals needed?  (SW, Diabetes education, HH, etc.)    Other issues/Miscellaneous?  (Transportation, access to meals, ability to perform ADLs, adequate caregiver support, etc.)    Next Outreach Scheduled: Week of 2018     Next Steps/Goals:   Return care  calls   Care  completing call: Kvng Wheeler RN

## 2018-04-09 NOTE — PROGRESS NOTES
Downtime progress note entry for [facility] Care :  Lucita Dubon RN    Care  Follow up Outreach Note   Outreach type:  Patient name:  :  MRN:  H: 2nd call returned call  Norma Aiken. Pulcine  1965  [id number]  [id number]   Date of follow up  . 18 @ 4pm     Reason for follow-up:   Hypertropic obstructive cardiomyopathy   Disease specific complaints/issues: None at this time     Patient progress towards goals set from last contact:   18 @ 4pm. Member returned my call. She saw Cardiology on 3/30, he increased her lasix. She is breathing much better. No chest or shortness of breath at this time. She still has not found a PCP. Ask her about calling [facility] in [facility], she states she is now in the process of moving back to [place]. [facility] [employer] in [place] with phone numbers given to member. She stated she will call an establish a relationship. Educated her about the importance of having a PCP and following up with her cardiologist on a regular basis. She voiced understanding. Re-educated her that is she has sudden chest pain, sudden shortness of breath, jaw or arm pain to please call 911 or get to the nearest ER as soon as possible. She voiced understanding. States she is taking medications as prescribed. Has patient attended any PCP or specialist follow-up appointments since last contact? What was outcome of appointment? When is next follow-up scheduled? No   Review medications. Any medication changes since last outreach? Does patient have any questions or issues related to their medications? Cardiology increased Lasix   Home health active? If yes - any issue? Progress? No   Referrals needed?  (SW, Diabetes education, HH, etc. ) None   Other issues/Miscellaneous?  (Transportation, access to meals, ability to perform ADLs, adequate caregiver support, etc.) Has friend that is transporting her   Next Outreach Scheduled: Week      Next Steps/Goals: Establish a relationship with a PCP.  Call Cardiologist with any problems or concerns   Care  completing call: Usha Jimenez RN

## 2018-04-11 ENCOUNTER — PATIENT OUTREACH (OUTPATIENT)
Dept: CASE MANAGEMENT | Age: 53
End: 2018-04-11

## 2018-04-13 ENCOUNTER — PATIENT OUTREACH (OUTPATIENT)
Dept: CASE MANAGEMENT | Age: 53
End: 2018-04-13

## 2018-04-13 NOTE — PROGRESS NOTES
Downtime progress note entry for [facility] :  Luís Stubbs RN    Care  Follow up Outreach Note   Outreach type:  Patient name:  :  MRN:  H: 3rd call  Ayanna Avelar. Pulcine  1965  [id number]  [id number]   Date of follow up  .    18 @ 2:15pm     Reason for follow-up:   Hypertropic obstructive cardiomyopathy   Disease specific complaints/issues:      Patient progress towards goals set from last contact:   18 @ 2:15pm- Called and left voicemail with my contact information. Has patient attended any PCP or specialist follow-up appointments since last contact? What was outcome of appointment? When is next follow-up scheduled? Review medications. Any medication changes since last outreach? Does patient have any questions or issues related to their medications? Home health active? If yes - any issue? Progress? Referrals needed?  (SW, Diabetes education, HH, etc.)    Other issues/Miscellaneous?  (Transportation, access to meals, ability to perform ADLs, adequate caregiver support, etc.)    Next Outreach Scheduled: Week      Next Steps/Goals:   Return Care  calls   Care  completing call: Luís Stubbs RN

## 2018-04-16 ENCOUNTER — PATIENT OUTREACH (OUTPATIENT)
Dept: CASE MANAGEMENT | Age: 53
End: 2018-04-16

## 2018-04-16 NOTE — PROGRESS NOTES
Downtime progress note entry for [facility] :  Reva Irvin RN    Care  Follow up Outreach Note   Outreach type:  Patient name:  :  MRN:  H: 4th call  Renetta Harris Pulcine  1965  [id number]  [id number]   Date of follow up  .    18 @ 11:45am     Reason for follow-up:   Hypertropic obstructive cardiomyopathy   Disease specific complaints/issues:      Patient progress towards goals set from last contact:   18 @ 11:45am- Called and left voicemail with my contact information. Has patient attended any PCP or specialist follow-up appointments since last contact? What was outcome of appointment? When is next follow-up scheduled? Review medications. Any medication changes since last outreach? Does patient have any questions or issues related to their medications? Home health active? If yes - any issue? Progress? Referrals needed?  (SW, Diabetes education, HH, etc.)    Other issues/Miscellaneous?  (Transportation, access to meals, ability to perform ADLs, adequate caregiver support, etc.)    Next Outreach Scheduled: Week      Next Steps/Goals:   Return Care  calls   Care  completing call: Reva Irvin RN

## 2018-04-17 NOTE — PROGRESS NOTES
Downtime progress note entry for [facility] :  Yuan Tesfaye RN    Care  Follow up Outreach Note   Outreach type:  Patient name:  :  MRN:  H: 5th call  Naz Gonzalez  1965  [id number]  [id number]   Date of follow up  . 18 @ 12pm     Reason for follow-up:   Hypertropic obstructive cardiomyopathy   Disease specific complaints/issues:      Patient progress towards goals set from last contact:   18 @ 12pm- Called and left voicemail with my contact information. Has patient attended any PCP or specialist follow-up appointments since last contact? What was outcome of appointment? When is next follow-up scheduled? Review medications. Any medication changes since last outreach? Does patient have any questions or issues related to their medications? Home health active? If yes - any issue? Progress? Referrals needed?  (SW, Diabetes education, HH, etc.)    Other issues/Miscellaneous?  (Transportation, access to meals, ability to perform ADLs, adequate caregiver support, etc.)    Next Outreach Scheduled: Week      Next Steps/Goals:   Return Care  call   Care  completing call: Yuan Tesfaye RN

## 2018-04-20 ENCOUNTER — PATIENT OUTREACH (OUTPATIENT)
Dept: CASE MANAGEMENT | Age: 53
End: 2018-04-20

## 2018-04-20 NOTE — PROGRESS NOTES
Downtime progress note entry for [facility] :  Meg Velasquez RN    Care  Follow up Outreach Note   Outreach type:  Patient name:  :  MRN:  H: 6th call  Reanna Myers  1965  [id number]  [id number]   Date of follow up  .    18 @ 10:30am     Reason for follow-up:   Hypertropic obstructive cardiomyopathy   Disease specific complaints/issues:      Patient progress towards goals set from last contact:   18 @ 10:30am- Called and left voicemail with my contact information. Has patient attended any PCP or specialist follow-up appointments since last contact? What was outcome of appointment? When is next follow-up scheduled? Review medications. Any medication changes since last outreach? Does patient have any questions or issues related to their medications? Home health active? If yes - any issue? Progress? Referrals needed?  (SW, Diabetes education, HH, etc.)    Other issues/Miscellaneous?  (Transportation, access to meals, ability to perform ADLs, adequate caregiver support, etc.)    Next Outreach Scheduled: Week      Next Steps/Goals:   Return care  calls   Care  completing call: Meg Velasquez RN

## 2018-04-23 ENCOUNTER — PATIENT OUTREACH (OUTPATIENT)
Dept: CASE MANAGEMENT | Age: 53
End: 2018-04-23

## 2018-04-24 NOTE — PROGRESS NOTES
[facility]  Kenny Saenz RN    Care  Follow up Outreach Note   Outreach type:  Patient name:  :  MRN:  H: 7th call  Garrison Ambrose  1965  [id number]  [id number]   Date of follow up  .    18 @ 9:36am     Reason for follow-up:   Hypertropic obstructive cardiomyopathy   Disease specific complaints/issues: None     Patient progress towards goals set from last contact:   18  @ 9:36am- Called and member, she states she has been doing well. No chest pain, shortness of breath, or fevers. She states she has all of her medications and is taking them as prescribed. She stated she has not been back to see Dr. Desmond Mejia her cardiologist. She has not established a relationship with a PCP either. Educated her on the importance of following up with cardiologist due to her heart condition and she voiced understanding but stated that she is not settled right now so it would have to wait. Offered to help her establish relationship with a PCP. She stated she is now living close to [facility] to do 3 way call with her at [facility] as a new patient but she refused at this time. She gave me a new phone number to contact her ([phone]). She assured me that she has refills on all of her medications and she is taking them. No signs of bleeding in urine or stool. She states she is no longer doing illegal drugs. Encouraged her to call Cardiologist to make appointment, to call [facility] to establish relationship and to call me with any questions . She also has [address] contact if needed. Has patient attended any PCP or specialist follow-up appointments since last contact? What was outcome of appointment? When is next follow-up scheduled? None   Review medications. Any medication changes since last outreach? Does patient have any questions or issues related to their medications?    No changes, questions, or issues with medications   Home health active? If yes - any issue? Progress? None   Referrals needed?  (SW, Diabetes education, HH, etc.) None   Other issues/Miscellaneous? (Transportation, access to meals, ability to perform ADLs, adequate caregiver support, etc.) None   Next Outreach Scheduled: Week of April 23, 2018     Next Steps/Goals:   Establish relationship with PCP. Call and schedule follow up with Dr. Tejinder Keller, take medications as prescribed , if you develop chest pain, shortness of breath, arm or jaw pain, call 911.     Care  completing call: Truong Orantes RN

## 2018-05-02 ENCOUNTER — PATIENT OUTREACH (OUTPATIENT)
Dept: CASE MANAGEMENT | Age: 53
End: 2018-05-02

## 2018-05-03 NOTE — PROGRESS NOTES
Downtime progress note entry for [facility] :  Truong Orantes RN    Care  Follow up Outreach Note   Outreach type:  Patient name:  :  MRN:  H: 8th call  Kitty Garcia  1965  [id number]  [id number]   Date of follow up  . 18 @ 3:18pm     Reason for follow-up:   Hypertrophic obstructive cardiomyopathy   Disease specific complaints/issues: Unknown     Patient progress towards goals set from last contact:   18 @ 3:18pm- Called and left message with my contact information. Has patient attended any PCP or specialist follow-up appointments since last contact? What was outcome of appointment? When is next follow-up scheduled? Unknown   Review medications. Any medication changes since last outreach? Does patient have any questions or issues related to their medications? Unknown   Home health active? If yes - any issue? Progress? Unknown   Referrals needed?  (SW, Diabetes education, HH, etc.) Unknown   Other issues/Miscellaneous?  (Transportation, access to meals, ability to perform ADLs, adequate caregiver support, etc.) Unknown   Next Outreach Scheduled: Week of May 7 , 2018     Next Steps/Goals:   Return Care  calls   Care  completing call: Truong Orantes RN

## 2018-05-04 ENCOUNTER — PATIENT OUTREACH (OUTPATIENT)
Dept: CASE MANAGEMENT | Age: 53
End: 2018-05-04

## 2018-05-07 NOTE — PROGRESS NOTES
Downtime progress note entry for [facility] :  Reva Irvin RN    Care  Follow up Outreach Note   Outreach type:  Patient name:  :  MRN:  H: 9th call  Li Lewis  1965  [id number]  [id number]   Date of follow up  . 18 @ 9:48am     Reason for follow-up:   Heart Disease   Disease specific complaints/issues: Unknown     Patient progress towards goals set from last contact:   18 @ 9:48am Called left message with the [employer] [employer] and my contact information and that this would be my last call. Has patient attended any PCP or specialist follow-up appointments since last contact? What was outcome of appointment? When is next follow-up scheduled? Unknown   Review medications. Any medication changes since last outreach? Does patient have any questions or issues related to their medications? Unknown   Home health active? If yes - any issue? Progress? Unknown   Referrals needed?  (SW, Diabetes education, HH, etc.) Unknown   Other issues/Miscellaneous? (Transportation, access to meals, ability to perform ADLs, adequate caregiver support, etc.) Unknown   Next Outreach Scheduled:      Next Steps/Goals:   Last call, she will no longer answer voice mail messages. Left my contact information and the [employer] number.    Care  completing call: Reva Irvin RN

## 2018-05-28 ENCOUNTER — HOSPITAL ENCOUNTER (INPATIENT)
Age: 53
LOS: 1 days | Discharge: HOME | DRG: 310 | End: 2018-06-01
Attending: EMERGENCY MEDICINE | Admitting: INTERNAL MEDICINE
Payer: MEDICARE

## 2018-05-28 DIAGNOSIS — I48.91 ATRIAL FIBRILLATION WITH RAPID VENTRICULAR RESPONSE (HCC): Primary | ICD-10-CM

## 2018-05-28 DIAGNOSIS — Z45.02 DEFIBRILLATOR DISCHARGE: ICD-10-CM

## 2018-05-28 LAB
BASOPHILS # BLD: 0 K/UL (ref 0–0.2)
BASOPHILS NFR BLD: 0 % (ref 0–2)
DIFFERENTIAL METHOD BLD: ABNORMAL
EOSINOPHIL # BLD: 0.4 K/UL (ref 0–0.8)
EOSINOPHIL NFR BLD: 4 % (ref 0.5–7.8)
ERYTHROCYTE [DISTWIDTH] IN BLOOD BY AUTOMATED COUNT: 18.6 % (ref 11.9–14.6)
HCT VFR BLD AUTO: 45.6 % (ref 35.8–46.3)
HGB BLD-MCNC: 14.8 G/DL (ref 11.7–15.4)
IMM GRANULOCYTES # BLD: 0 K/UL (ref 0–0.5)
IMM GRANULOCYTES NFR BLD AUTO: 0 % (ref 0–5)
LYMPHOCYTES # BLD: 2.5 K/UL (ref 0.5–4.6)
LYMPHOCYTES NFR BLD: 26 % (ref 13–44)
MCH RBC QN AUTO: 26 PG (ref 26.1–32.9)
MCHC RBC AUTO-ENTMCNC: 32.5 G/DL (ref 31.4–35)
MCV RBC AUTO: 80.1 FL (ref 79.6–97.8)
MONOCYTES # BLD: 1.1 K/UL (ref 0.1–1.3)
MONOCYTES NFR BLD: 11 % (ref 4–12)
NEUTS SEG # BLD: 5.6 K/UL (ref 1.7–8.2)
NEUTS SEG NFR BLD: 59 % (ref 43–78)
PLATELET # BLD AUTO: 77 K/UL (ref 150–450)
PMV BLD AUTO: ABNORMAL FL (ref 10.8–14.1)
RBC # BLD AUTO: 5.69 M/UL (ref 4.05–5.25)
TROPONIN I BLD-MCNC: 0.09 NG/ML (ref 0.02–0.05)
WBC # BLD AUTO: 9.6 K/UL (ref 4.3–11.1)

## 2018-05-28 PROCEDURE — 93005 ELECTROCARDIOGRAM TRACING: CPT | Performed by: EMERGENCY MEDICINE

## 2018-05-28 PROCEDURE — 99285 EMERGENCY DEPT VISIT HI MDM: CPT | Performed by: EMERGENCY MEDICINE

## 2018-05-28 PROCEDURE — 74011000250 HC RX REV CODE- 250: Performed by: EMERGENCY MEDICINE

## 2018-05-28 PROCEDURE — 80053 COMPREHEN METABOLIC PANEL: CPT | Performed by: EMERGENCY MEDICINE

## 2018-05-28 PROCEDURE — 96375 TX/PRO/DX INJ NEW DRUG ADDON: CPT | Performed by: EMERGENCY MEDICINE

## 2018-05-28 PROCEDURE — 96365 THER/PROPH/DIAG IV INF INIT: CPT | Performed by: EMERGENCY MEDICINE

## 2018-05-28 PROCEDURE — 74011000258 HC RX REV CODE- 258: Performed by: EMERGENCY MEDICINE

## 2018-05-28 PROCEDURE — 84484 ASSAY OF TROPONIN QUANT: CPT

## 2018-05-28 PROCEDURE — 74011250636 HC RX REV CODE- 250/636

## 2018-05-28 PROCEDURE — 85025 COMPLETE CBC W/AUTO DIFF WBC: CPT | Performed by: EMERGENCY MEDICINE

## 2018-05-28 PROCEDURE — 84443 ASSAY THYROID STIM HORMONE: CPT | Performed by: EMERGENCY MEDICINE

## 2018-05-28 PROCEDURE — 83735 ASSAY OF MAGNESIUM: CPT | Performed by: EMERGENCY MEDICINE

## 2018-05-28 RX ORDER — LORAZEPAM 2 MG/ML
INJECTION INTRAMUSCULAR
Status: COMPLETED
Start: 2018-05-28 | End: 2018-05-28

## 2018-05-28 RX ORDER — DILTIAZEM HYDROCHLORIDE 5 MG/ML
20 INJECTION INTRAVENOUS ONCE
Status: COMPLETED | OUTPATIENT
Start: 2018-05-28 | End: 2018-05-28

## 2018-05-28 RX ORDER — LORAZEPAM 2 MG/ML
1 INJECTION INTRAMUSCULAR
Status: COMPLETED | OUTPATIENT
Start: 2018-05-28 | End: 2018-05-28

## 2018-05-28 RX ORDER — DILTIAZEM HYDROCHLORIDE 5 MG/ML
20 INJECTION INTRAVENOUS
Status: COMPLETED | OUTPATIENT
Start: 2018-05-28 | End: 2018-05-28

## 2018-05-28 RX ADMIN — LORAZEPAM 1 MG: 2 INJECTION INTRAMUSCULAR at 22:58

## 2018-05-28 RX ADMIN — SODIUM CHLORIDE 5 MG/HR: 900 INJECTION, SOLUTION INTRAVENOUS at 23:24

## 2018-05-28 RX ADMIN — DILTIAZEM HYDROCHLORIDE 20 MG: 5 INJECTION INTRAVENOUS at 22:43

## 2018-05-28 RX ADMIN — DILTIAZEM HYDROCHLORIDE 20 MG: 5 INJECTION INTRAVENOUS at 23:23

## 2018-05-28 RX ADMIN — LORAZEPAM 1 MG: 2 INJECTION INTRAMUSCULAR; INTRAVENOUS at 22:58

## 2018-05-28 NOTE — IP AVS SNAPSHOT
[address] [address] 
[phone] Patient: Leyla Burch MRN: [id number] :1965 About your hospitalization You were admitted on:  May 29, 2018 You last received care in the:  [facility] 3 CLINICAL OBSERVATION You were discharged on:  2018 Why you were hospitalized Your primary diagnosis was:  Atrial Fibrillation With Rvr (Hcc) Your diagnoses also included:  Coronary Artery Disease Involving Native Coronary Artery Of Native Heart, Dyslipidemia, Essential Hypertension, Tobacco Use Disorder, Methamphetamine Abuse Follow-up Information Follow up With Details Comments Contact Info Chey Rahman MD   [address] [place] [address] [facility] [address] [facility], [place] [address] 
[phone] Danny Camarillo MD  As needed [address] [facility] [address] 
[phone] Chey Rahman MD On 2018 at 80 am in [facility] office  [address] [facility], [place] [address] 
[phone] Discharge Orders None A check miranda indicates which time of day the medication should be taken. My Medications START taking these medications Instructions Each Dose to Equal  
 Morning Noon Evening Bedtime  
 dilTIAZem  mg ER capsule Commonly known as:  CARDIZEM CD Take 1 Cap by mouth daily. 240 mg  
    
2018 CONTINUE taking these medications Instructions Each Dose to Equal  
 Morning Noon Evening Bedtime  
 albuterol 90 mcg/actuation inhaler Commonly known as:  PROVENTIL HFA, VENTOLIN HFA, PROAIR HFA Take 2 Puffs by inhalation every six (6) hours as needed for Wheezing. 2 Puff  
    
   
   
   
  
 atorvastatin 80 mg tablet Commonly known as:  LIPITOR Take 1 Tab by mouth nightly. 80 mg  
    
   
   
   
2018  
  
 busPIRone 10 mg tablet Commonly known as:  BUSPAR  
   
 Take 1 Tab by mouth three (3) times daily (with meals). 10 mg  
    
   
6/1/2018 6/1/2018  
  
 clopidogrel 75 mg Tab Commonly known as:  PLAVIX Take 1 Tab by mouth daily. 75 mg  
    
6/2/2018  
   
   
   
  
 nitroglycerin 0.4 mg SL tablet Commonly known as:  NITROSTAT  
   
 1 Tab by SubLINGual route every five (5) minutes as needed for Chest Pain. 0.4 mg  
    
   
   
   
  
 rivaroxaban 15 mg Tab tablet Commonly known as:  Anastacia Ross Take 1 Tab by mouth daily (with dinner). 15 mg  
    
   
   
6/1/2018 STOP taking these medications   
 metoprolol succinate 50 mg XL tablet Commonly known as:  TOPROL-XL Where to Get Your Medications Information on where to get these meds will be given to you by the nurse or doctor. ! Ask your nurse or doctor about these medications  
  dilTIAZem  mg ER capsule Discharge Instructions Diltiazem (By mouth) Diltiazem ([id number]) Treats high blood pressure and angina (chest pain). This medicine is a calcium channel blocker. Brand Name(s): Cardizem, Cardizem CD, Cardizem LA, Cartia XT, Dilt-XR, Matzim LA, [place], [facility] There may be other brand names for this medicine. When This Medicine Should Not Be Used: This medicine is not right for everyone. Do not use it if you had an allergic reaction to diltiazem or similar medicines. How to Use This Medicine:  
Long Acting Capsule, 12 Hour Capsule, 24 Hour Capsule, Tablet, Long Acting Tablet · Take your medicine as directed. Your dose may need to be changed several times to find what works best for you. · It is best to take this medicine on an empty stomach. · Swallow this medicine whole. Do not crush, break, chew, or open the capsule or tablet. · Missed dose: Take a dose as soon as you remember. If it is almost time for your next dose, wait until then and take a regular dose.  Do not take extra medicine to make up for a missed dose. · Store the medicine in a closed container at room temperature, away from heat, moisture, and direct light. Drugs and Foods to Avoid: Ask your doctor or pharmacist before using any other medicine, including over-the-counter medicines, vitamins, and herbal products. · Some medicines can affect how diltiazem works. Tell your doctor if you are using the following: 
¨ Buspirone, carbamazepine, cimetidine, clonidine, cyclosporine, digoxin, ivabradine, lovastatin, midazolam, quinidine, rifampin, simvastatin, triazolam 
¨ Other blood pressure medicine, including a beta-blocker Tia Sandoval Warnings While Using This Medicine: · Tell your doctor if you are pregnant or breastfeeding, or if you have kidney disease, liver disease, or digestion problems. Tell your doctor about all heart problems that you have, including heart failure or rhythm problems. · This medicine may cause the following problems: ¨ Slow heartbeat ¨ Worsening of heart failure ¨ Serious skin reactions · This medicine could lower your blood pressure too much, especially when you first use it or if you are dehydrated. Stand or sit up slowly if you feel lightheaded or dizzy. Do not drive or do anything else that could be dangerous until you know how this medicine affects you. · Do not stop using this medicine without asking your doctor, even if you feel well. This medicine will not cure high blood pressure, but it will help keep it in normal range. You may have to take blood pressure medicine for the rest of your life. · Your doctor will do lab tests at regular visits to check on the effects of this medicine. Keep all appointments. · Keep all medicine out of the reach of children. Never share your medicine with anyone. Possible Side Effects While Using This Medicine:  
Call your doctor right away if you notice any of these side effects: · Allergic reaction: Itching or hives, swelling in your face or hands, swelling or tingling in your mouth or throat, chest tightness, trouble breathing · Blistering, peeling, red skin rash · Dark urine or pale stools, nausea, vomiting, loss of appetite, stomach pain, yellow skin or eyes · Fast, slow, uneven, or pounding heartbeat · Rapid weight gain, swelling in your hands, ankles, or feet If you notice other side effects that you think are caused by this medicine, tell your doctor. Call your doctor for medical advice about side effects. You may report side effects to FDA at 6-268-QGA-4151 © 2017 [facility] Information is for End User's use only and may not be sold, redistributed or otherwise used for commercial purposes. The above information is an  only. It is not intended as medical advice for individual conditions or treatments. Talk to your doctor, nurse or pharmacist before following any medical regimen to see if it is safe and effective for you. Atrial Fibrillation: Care Instructions Your Care Instructions Atrial fibrillation is an irregular and often fast heartbeat. Treating this condition is important for several reasons. It can cause blood clots, which can travel from your heart to your brain and cause a stroke. If you have a fast heartbeat, you may feel lightheaded, dizzy, and weak. An irregular heartbeat can also increase your risk for heart failure. Atrial fibrillation is often the result of another heart condition, such as high blood pressure or coronary artery disease. Making changes to improve your heart condition will help you stay healthy and active. Follow-up care is a key part of your treatment and safety. Be sure to make and go to all appointments, and call your doctor if you are having problems. It's also a good idea to know your test results and keep a list of the medicines you take. How can you care for yourself at home? Medicines ? · Take your medicines exactly as prescribed.  Call your doctor if you think you are having a problem with your medicine. You will get more details on the specific medicines your doctor prescribes. ? · If your doctor has given you a blood thinner to prevent a stroke, be sure you get instructions about how to take your medicine safely. Blood thinners can cause serious bleeding problems. ? · Do not take any vitamins, over-the-counter drugs, or herbal products without talking to your doctor first. ? Lifestyle changes ? · Do not smoke. Smoking can increase your chance of a stroke and heart attack. If you need help quitting, talk to your doctor about stop-smoking programs and medicines. These can increase your chances of quitting for good. ? · Eat a heart-healthy diet. ? · Stay at a healthy weight. Lose weight if you need to.  
? · Limit alcohol to 2 drinks a day for men and 1 drink a day for women. Too much alcohol can cause health problems. ? · Avoid colds and flu. Get a pneumococcal vaccine shot. If you have had one before, ask your doctor whether you need another dose. Get a flu shot every year. If you must be around people with colds or flu, wash your hands often. Activity ? · If your doctor recommends it, get more exercise. Walking is a good choice. Bit by bit, increase the amount you walk every day. Try for at least 30 minutes on most days of the week. You also may want to swim, bike, or do other activities. Your doctor may suggest that you join a cardiac rehabilitation program so that you can have help increasing your physical activity safely. ? · Start light exercise if your doctor says it is okay. Even a small amount will help you get stronger, have more energy, and manage stress. Walking is an easy way to get exercise. Start out by walking a little more than you did in the hospital. Gradually increase the amount you walk. ? · When you exercise, watch for signs that your heart is working too hard.  You are pushing too hard if you cannot talk while you are exercising. If you become short of breath or dizzy or have chest pain, sit down and rest immediately. ? · Check your pulse regularly. Place two fingers on the artery at the palm side of your wrist, in line with your thumb. If your heartbeat seems uneven or fast, talk to your doctor. When should you call for help? Call 911 anytime you think you may need emergency care. For example, call if: 
? · You have symptoms of a heart attack. These may include: ¨ Chest pain or pressure, or a strange feeling in the chest. 
¨ Sweating. ¨ Shortness of breath. ¨ Nausea or vomiting. ¨ Pain, pressure, or a strange feeling in the back, neck, jaw, or upper belly or in one or both shoulders or arms. ¨ Lightheadedness or sudden weakness. ¨ A fast or irregular heartbeat. After you call 911, the  may tell you to chew 1 adult-strength or 2 to 4 low-dose aspirin. Wait for an ambulance. Do not try to drive yourself. ? · You have symptoms of a stroke. These may include: 
¨ Sudden numbness, tingling, weakness, or loss of movement in your face, arm, or leg, especially on only one side of your body. ¨ Sudden vision changes. ¨ Sudden trouble speaking. ¨ Sudden confusion or trouble understanding simple statements. ¨ Sudden problems with walking or balance. ¨ A sudden, severe headache that is different from past headaches. ? · You passed out (lost consciousness). ?Call your doctor now or seek immediate medical care if: 
? · You have new or increased shortness of breath. ? · You feel dizzy or lightheaded, or you feel like you may faint. ? · Your heart rate becomes irregular. ? · You can feel your heart flutter in your chest or skip heartbeats. Tell your doctor if these symptoms are new or worse. ? Watch closely for changes in your health, and be sure to contact your doctor if you have any problems. Where can you learn more? Go to http://marcel.info/. Enter U020 in the search box to learn more about \"Atrial Fibrillation: Care Instructions. \" Current as of: September 21, 2016 Content Version: 11.4 © 6495-3709 Tindie. Care instructions adapted under license by [employer] (which disclaims liability or warranty for this information). If you have questions about a medical condition or this instruction, always ask your healthcare professional. [address] any warranty or liability for your use of this information. [employer] Announcement We are excited to announce that we are making your provider's discharge notes available to you in [employer]. You will see these notes when they are completed and signed by the physician that discharged you from your recent hospital stay. If you have any questions or concerns about any information you see in [employer], please call the Health Information Department where you were seen or reach out to your Primary Care Provider for more information about your plan of care. Introducing [facility]! Jolly Lozano introduces [employer] patient portal. Now you can access parts of your medical record, email your doctor's office, and request medication refills online. 1. In your internet browser, go to [email or web address] "[email or web address] 2. Click on the First Time User? Click Here link in the Sign In box. You will see the New Member Sign Up page. 3. Enter your [employer] Access Code exactly as it appears below. You will not need to use this code after youve completed the sign-up process. If you do not sign up before the expiration date, you must request a new code. · [employer] Access Code: [email or web address] Expires: 6/18/2018  6:08 PM 
 
4. Enter the last four digits of your Social Security Number (xxxx) and Date of Birth (mm/dd/yyyy) as indicated and click Submit. You will be taken to the next sign-up page. 5. Create a [employer]o ID. This will be your [employer] login ID and cannot be changed, so think of one that is secure and easy to remember. 6. Create a [employer] password. You can change your password at any time. 7. Enter your Password Reset Question and Answer. This can be used at a later time if you forget your password. 8. Enter your e-mail address. You will receive e-mail notification when new information is available in [facility] 9. Click Sign Up. You can now view and download portions of your medical record. 10. Click the Download Summary menu link to download a portable copy of your medical information. If you have questions, please visit the Frequently Asked Questions section of the [employer] website. Remember, [employer] is NOT to be used for urgent needs. For medical emergencies, dial 911. Now available from your iPhone and Android! Introducing Nahum [employer]se As a [employer] patient, I wanted to make you aware of our electronic visit tool called Nahum Tse. [employer] 24/7 allows you to connect within minutes with a medical provider 24 hours a day, seven days a week via a mobile device or tablet or logging into a secure website from your computer. You can access Nahum Tse from anywhere in the [place]. A virtual visit might be right for you when you have a simple condition and feel like you just dont want to get out of bed, or cant get away from work for an appointment, when your regular [employer] provider is not available (evenings, weekends or holidays), or when youre out of town and need minor care. Electronic visits cost only $49 and if the [employer] 24/7 provider determines a prescription is needed to treat your condition, one can be electronically transmitted to a nearby pharmacy*. Please take a moment to enroll today if you have not already done so. The enrollment process is free and takes just a few minutes.   To enroll, please download the [facility]ene Onealer 24/7 mela to your tablet or phone, or visit [email or web address] to enroll on your computer. And, as an [address] [place] [address] patient with a [employer] account, the results of your visits will be scanned into your electronic medical record and your primary care provider will be able to view the scanned results. We urge you to continue to see your regular Rene Walsh provider for your ongoing medical care. And while your primary care provider may not be the one available when you seek a [employer] virtual visit, the peace of mind you get from getting a real diagnosis real time can be priceless. For more information on [employer], view our Frequently Asked Questions (FAQs) at [email or web address]. Sincerely, 
 
Enrique Hodge MD 
Chief Medical Officer Mark Leatha Navarro *:  certain medications cannot be prescribed via [employer] Providers Seen During Your Hospitalization Provider Specialty Primary office phone Irl Dakin, MD Emergency Medicine [phone] Essence Salazar MD Cardiology [phone] Your Primary Care Physician (PCP) Primary Care Physician Office Phone Office Fax [address] [facility],  [address] [phone] You are allergic to the following Allergen Reactions Sulfa (Sulfonamide Antibiotics) Itching Recent Documentation Height Weight Breastfeeding? BMI OB Status Smoking Status 1.575 m 58.4 kg No 23.56 kg/m2 Having regular periods Current Every Day Smoker Emergency Contacts Name Discharge Info Relation Home Work Mobile Bill Bar  Other Relative [6] [phone] Patient Belongings The following personal items are in your possession at time of discharge: 
  Dental Appliances: Uppers, At bedside  Visual Aid: None      Home Medications: None   Jewelry: Necklace  Clothing:  At bedside, Pants, Shirt, Undergarments, Footwear    Other Valuables: Purse  Personal Items Sent to Safe: declined Please provide this summary of care documentation to your next provider. Signatures-by signing, you are acknowledging that this After Visit Summary has been reviewed with you and you have received a copy. Patient Signature:  ____________________________________________________________ Date:  ____________________________________________________________  
  
[facility] Provider Signature:  ____________________________________________________________ Date:  ____________________________________________________________

## 2018-05-28 NOTE — IP AVS SNAPSHOT
[address] [place] [id number] 
[phone] Patient: Misty Valente MRN: [id number] :1965 A check miranda indicates which time of day the medication should be taken. My Medications START taking these medications Instructions Each Dose to Equal  
 Morning Noon Evening Bedtime  
 dilTIAZem  mg ER capsule Commonly known as:  CARDIZEM CD Take 1 Cap by mouth daily. 240 mg  
    
2018 CONTINUE taking these medications Instructions Each Dose to Equal  
 Morning Noon Evening Bedtime  
 albuterol 90 mcg/actuation inhaler Commonly known as:  PROVENTIL HFA, VENTOLIN HFA, PROAIR HFA Take 2 Puffs by inhalation every six (6) hours as needed for Wheezing. 2 Puff  
    
   
   
   
  
 atorvastatin 80 mg tablet Commonly known as:  LIPITOR Take 1 Tab by mouth nightly. 80 mg  
    
   
   
   
2018  
  
 busPIRone 10 mg tablet Commonly known as:  BUSPAR Take 1 Tab by mouth three (3) times daily (with meals). 10 mg  
    
   
2018  
  
 clopidogrel 75 mg Tab Commonly known as:  PLAVIX Take 1 Tab by mouth daily. 75 mg  
    
2018  
   
   
   
  
 nitroglycerin 0.4 mg SL tablet Commonly known as:  NITROSTAT  
   
 1 Tab by SubLINGual route every five (5) minutes as needed for Chest Pain. 0.4 mg  
    
   
   
   
  
 rivaroxaban 15 mg Tab tablet Commonly known as:  Luevenia Shown Take 1 Tab by mouth daily (with dinner). 15 mg  
    
   
   
2018 STOP taking these medications   
 metoprolol succinate 50 mg XL tablet Commonly known as:  TOPROL-XL Where to Get Your Medications Information on where to get these meds will be given to you by the nurse or doctor. ! Ask your nurse or doctor about these medications  
  dilTIAZem  mg ER capsule

## 2018-05-29 PROBLEM — R07.9 CHEST PAIN AT REST: Status: RESOLVED | Noted: 2018-03-20 | Resolved: 2018-05-29

## 2018-05-29 PROBLEM — I48.91 ATRIAL FIBRILLATION WITH RVR (HCC): Status: ACTIVE | Noted: 2018-05-29

## 2018-05-29 PROBLEM — I48.91 ATRIAL FIBRILLATION WITH RAPID VENTRICULAR RESPONSE (HCC): Status: RESOLVED | Noted: 2018-03-20 | Resolved: 2018-05-29

## 2018-05-29 PROBLEM — F15.10 METHAMPHETAMINE ABUSE (HCC): Chronic | Status: ACTIVE | Noted: 2018-05-29

## 2018-05-29 PROBLEM — I21.4 NSTEMI (NON-ST ELEVATED MYOCARDIAL INFARCTION) (HCC): Status: RESOLVED | Noted: 2018-03-21 | Resolved: 2018-05-29

## 2018-05-29 LAB
ALBUMIN SERPL-MCNC: 3.2 G/DL (ref 3.5–5)
ALBUMIN/GLOB SERPL: 0.7 {RATIO} (ref 1.2–3.5)
ALP SERPL-CCNC: 136 U/L (ref 50–136)
ALT SERPL-CCNC: 40 U/L (ref 12–65)
AMPHET UR QL SCN: POSITIVE
ANION GAP SERPL CALC-SCNC: 9 MMOL/L (ref 7–16)
AST SERPL-CCNC: 65 U/L (ref 15–37)
ATRIAL RATE: 129 BPM
BARBITURATES UR QL SCN: NEGATIVE
BENZODIAZ UR QL: NEGATIVE
BILIRUB SERPL-MCNC: 0.5 MG/DL (ref 0.2–1.1)
BUN SERPL-MCNC: 12 MG/DL (ref 6–23)
CALCIUM SERPL-MCNC: 8.6 MG/DL (ref 8.3–10.4)
CALCULATED R AXIS, ECG10: -75 DEGREES
CALCULATED T AXIS, ECG11: 101 DEGREES
CANNABINOIDS UR QL SCN: NEGATIVE
CHLORIDE SERPL-SCNC: 108 MMOL/L (ref 98–107)
CO2 SERPL-SCNC: 25 MMOL/L (ref 21–32)
COCAINE UR QL SCN: NEGATIVE
CREAT SERPL-MCNC: 0.83 MG/DL (ref 0.6–1)
DIAGNOSIS, 93000: NORMAL
GLOBULIN SER CALC-MCNC: 4.6 G/DL (ref 2.3–3.5)
GLUCOSE SERPL-MCNC: 118 MG/DL (ref 65–100)
MAGNESIUM SERPL-MCNC: 1.9 MG/DL (ref 1.8–2.4)
METHADONE UR QL: NEGATIVE
OPIATES UR QL: NEGATIVE
PCP UR QL: NEGATIVE
POTASSIUM SERPL-SCNC: 4.8 MMOL/L (ref 3.5–5.1)
PROT SERPL-MCNC: 7.8 G/DL (ref 6.3–8.2)
Q-T INTERVAL, ECG07: 294 MS
QRS DURATION, ECG06: 130 MS
QTC CALCULATION (BEZET), ECG08: 485 MS
SODIUM SERPL-SCNC: 142 MMOL/L (ref 136–145)
TSH SERPL DL<=0.005 MIU/L-ACNC: 2.74 UIU/ML (ref 0.36–3.74)
VENTRICULAR RATE, ECG03: 164 BPM

## 2018-05-29 PROCEDURE — 99218 HC RM OBSERVATION: CPT

## 2018-05-29 PROCEDURE — 74011000250 HC RX REV CODE- 250: Performed by: EMERGENCY MEDICINE

## 2018-05-29 PROCEDURE — 85018 HEMOGLOBIN: CPT | Performed by: NURSE PRACTITIONER

## 2018-05-29 PROCEDURE — 93308 TTE F-UP OR LMTD: CPT

## 2018-05-29 PROCEDURE — 74011250637 HC RX REV CODE- 250/637: Performed by: NURSE PRACTITIONER

## 2018-05-29 PROCEDURE — 80307 DRUG TEST PRSMV CHEM ANLYZR: CPT | Performed by: NURSE PRACTITIONER

## 2018-05-29 PROCEDURE — 74011000258 HC RX REV CODE- 258: Performed by: EMERGENCY MEDICINE

## 2018-05-29 PROCEDURE — 36415 COLL VENOUS BLD VENIPUNCTURE: CPT | Performed by: NURSE PRACTITIONER

## 2018-05-29 RX ORDER — ATORVASTATIN CALCIUM 40 MG/1
80 TABLET, FILM COATED ORAL
Status: DISCONTINUED | OUTPATIENT
Start: 2018-05-29 | End: 2018-06-01 | Stop reason: HOSPADM

## 2018-05-29 RX ORDER — METOPROLOL SUCCINATE 50 MG/1
50 TABLET, EXTENDED RELEASE ORAL DAILY
Status: DISCONTINUED | OUTPATIENT
Start: 2018-05-29 | End: 2018-05-30

## 2018-05-29 RX ORDER — ACETAMINOPHEN 325 MG/1
650 TABLET ORAL
Status: DISCONTINUED | OUTPATIENT
Start: 2018-05-29 | End: 2018-06-01 | Stop reason: HOSPADM

## 2018-05-29 RX ORDER — NITROGLYCERIN 0.4 MG/1
0.4 TABLET SUBLINGUAL
Status: DISCONTINUED | OUTPATIENT
Start: 2018-05-29 | End: 2018-06-01 | Stop reason: HOSPADM

## 2018-05-29 RX ORDER — IBUPROFEN 200 MG
1 TABLET ORAL DAILY
Status: DISCONTINUED | OUTPATIENT
Start: 2018-05-29 | End: 2018-06-01 | Stop reason: HOSPADM

## 2018-05-29 RX ORDER — SODIUM CHLORIDE 0.9 % (FLUSH) 0.9 %
5-10 SYRINGE (ML) INJECTION AS NEEDED
Status: DISCONTINUED | OUTPATIENT
Start: 2018-05-29 | End: 2018-06-01 | Stop reason: HOSPADM

## 2018-05-29 RX ORDER — GUAIFENESIN 100 MG/5ML
81 LIQUID (ML) ORAL DAILY
Status: DISCONTINUED | OUTPATIENT
Start: 2018-05-29 | End: 2018-06-01 | Stop reason: HOSPADM

## 2018-05-29 RX ORDER — ONDANSETRON 2 MG/ML
4 INJECTION INTRAMUSCULAR; INTRAVENOUS
Status: DISCONTINUED | OUTPATIENT
Start: 2018-05-29 | End: 2018-06-01 | Stop reason: HOSPADM

## 2018-05-29 RX ORDER — SODIUM CHLORIDE 0.9 % (FLUSH) 0.9 %
5-10 SYRINGE (ML) INJECTION EVERY 8 HOURS
Status: DISCONTINUED | OUTPATIENT
Start: 2018-05-29 | End: 2018-06-01 | Stop reason: HOSPADM

## 2018-05-29 RX ORDER — CLOPIDOGREL BISULFATE 75 MG/1
75 TABLET ORAL DAILY
Status: DISCONTINUED | OUTPATIENT
Start: 2018-05-29 | End: 2018-06-01 | Stop reason: HOSPADM

## 2018-05-29 RX ORDER — BUSPIRONE HYDROCHLORIDE 5 MG/1
10 TABLET ORAL
Status: DISCONTINUED | OUTPATIENT
Start: 2018-05-29 | End: 2018-06-01 | Stop reason: HOSPADM

## 2018-05-29 RX ADMIN — BUSPIRONE HYDROCHLORIDE 10 MG: 5 TABLET ORAL at 18:04

## 2018-05-29 RX ADMIN — BUSPIRONE HYDROCHLORIDE 10 MG: 5 TABLET ORAL at 08:31

## 2018-05-29 RX ADMIN — Medication 1 AMPULE: at 20:29

## 2018-05-29 RX ADMIN — BUSPIRONE HYDROCHLORIDE 10 MG: 5 TABLET ORAL at 13:48

## 2018-05-29 RX ADMIN — SODIUM CHLORIDE 5 MG/HR: 900 INJECTION, SOLUTION INTRAVENOUS at 15:42

## 2018-05-29 RX ADMIN — ASPIRIN 81 MG 81 MG: 81 TABLET ORAL at 08:31

## 2018-05-29 RX ADMIN — RIVAROXABAN 15 MG: 15 TABLET, FILM COATED ORAL at 17:39

## 2018-05-29 RX ADMIN — METOPROLOL SUCCINATE 50 MG: 50 TABLET, EXTENDED RELEASE ORAL at 08:31

## 2018-05-29 RX ADMIN — Medication 1 AMPULE: at 08:31

## 2018-05-29 RX ADMIN — CLOPIDOGREL BISULFATE 75 MG: 75 TABLET ORAL at 09:00

## 2018-05-29 RX ADMIN — ATORVASTATIN CALCIUM 80 MG: 40 TABLET, FILM COATED ORAL at 20:29

## 2018-05-29 RX ADMIN — SODIUM CHLORIDE 15 MG/HR: 900 INJECTION, SOLUTION INTRAVENOUS at 05:37

## 2018-05-29 NOTE — PROGRESS NOTES
Problem: Afib Pathway: Day 2  Goal: *Hemodynamically stable  Outcome: Resolved/Met Date Met: 05/29/18  VSS. Controlled A fib on cardizem gtt      Goal: *Optimal pain control at patient's stated goal  Outcome: Resolved/Met Date Met: 05/29/18  Denies pain or SOB      Goal: *Stable cardiac rhythm  Outcome: Resolved/Met Date Met: 05/29/18  Rate controlled a Afib      Problem: Falls - Risk of  Goal: *Absence of Falls  Document Abi Fall Risk and appropriate interventions in the flowsheet.    Outcome: Progressing Towards Goal  Fall Risk Interventions:  Mobility Interventions: Bed/chair exit alarm, Communicate number of staff needed for ambulation/transfer         Medication Interventions: Bed/chair exit alarm, Evaluate medications/consider consulting pharmacy, Patient to call before getting OOB, Teach patient to arise slowly    Elimination Interventions: Bed/chair exit alarm, Call light in reach, Elevated toilet seat

## 2018-05-29 NOTE — PROGRESS NOTES
TRANSFER - IN REPORT:    Verbal report received from Woody Munoz RN via Penny Talamantes RN (name) on [facility]per  being received from ED (unit) for routine progression of care      Report consisted of patients Situation, Background, Assessment and   Recommendations(SBAR). Information from the following report(s) SBAR, Kardex, [facility] and Recent Results was reviewed with the receiving nurse. Opportunity for questions and clarification was provided. Assessment completed upon patients arrival to unit and care assumed.

## 2018-05-29 NOTE — PROGRESS NOTES
Hourly BP and HR stable since arriving from ER. Afib 's. Vitals printed and placed on bedside chart.

## 2018-05-29 NOTE — PROGRESS NOTES
Medicare Outpatient Observation Notice provided to the patient. Oral explanation was provided and all questions answered. Signed document placed in the medical record. Copy to patient.

## 2018-05-29 NOTE — ED PROVIDER NOTES
HPI Comments: Patient is having palpitations and about an hour ago her defibrillator fired once. She denies any chest pain or trouble breathing but presents with a tachycardia that is irregular and in the 160s and 170s. It is a narrow complex tachycardia. Patient is a 47 y.o. female presenting with other event. The history is provided by the patient. Other   This is a new problem. The current episode started less than 1 hour ago. The problem occurs constantly. The problem has not changed since onset. Pertinent negatives include no chest pain and no shortness of breath. Nothing aggravates the symptoms. Nothing relieves the symptoms. She has tried nothing for the symptoms.         Past Medical History:   Diagnosis Date    Anemia 2/2011    and CHF; hospitalization at [facility] Atrial fibrillation with rapid ventricular response ([facility] [address] 3/20/2018    COPD     Depression     Essential hypertension 3/21/2018    Hypertrophic cardiomyopathy ([facility] [address]     NSTEMI (non-ST elevated myocardial infarction) ([facility] [address] 3/21/2018    Obesity     Sleep disorder 7/12/2013       Past Surgical History:   Procedure Laterality Date    CARDIAC SURG PROCEDURE UNLIST  2008    cardiac cath, defib    HX BREAST LUMPECTOMY  1995    right    HX CHOLECYSTECTOMY  1989    HX IMPLANTABLE CARDIOVERTER DEFIBRILLATOR  2008    PM    HX PACEMAKER  2008    pacemaker with defibrillator, septal ablation    HX TUBAL LIGATION  1992         Family History:   Problem Relation Age of Onset    Heart Disease Mother     Heart Attack Mother 50     massive MI    Diabetes Father     Heart Disease Father     Heart Disease Maternal Grandmother     Malignant Hyperthermia Neg Hx     Pseudocholinesterase Deficiency Neg Hx     Delayed Awakening Neg Hx     Post-op Nausea/Vomiting Neg Hx     Emergence Delirium Neg Hx     Post-op Cognitive Dysfunction Neg Hx     Other Neg Hx        Social History     Social History    Marital status: LEGALLY  Spouse name: N/A    Number of children: N/A    Years of education: N/A     Occupational History    Not on file. Social History Main Topics    Smoking status: Current Every Day Smoker     Packs/day: 0.50     Years: 30.00     Types: Cigarettes    Smokeless tobacco: Never Used    Alcohol use No      Comment: once a month    Drug use: No    Sexual activity: Yes     Partners: Male     Birth control/ protection: Surgical     Other Topics Concern    Not on file     Social History Narrative     from  and lives alone. No pets. She is disabled, previously worked in [employer] management. ALLERGIES: Sulfa (sulfonamide antibiotics)    Review of Systems   Constitutional: Negative for fever. Respiratory: Negative for cough and shortness of breath. Cardiovascular: Positive for palpitations. Negative for chest pain. Gastrointestinal: Negative for nausea and vomiting. Musculoskeletal: Negative for back pain. Neurological: Negative for weakness and numbness. Vitals:    05/28/18 2226   BP: 141/73   Pulse: (!) 171   Resp: 16   Temp: 98.2 °F (36.8 °C)   SpO2: 97%   Weight: 70.3 kg (155 lb)   Height: 5' 2\" (1.575 m)            Physical Exam   Constitutional: She is oriented to person, place, and time. She appears well-developed and well-nourished. She appears distressed. Mild diaphoresis   Eyes: Conjunctivae are normal.   Neck: Neck supple. No JVD present. Cardiovascular: Normal heart sounds. An irregularly irregular rhythm present. Tachycardia present. Pulmonary/Chest: Effort normal and breath sounds normal.   Abdominal: Soft. She exhibits no distension. There is no tenderness. There is no rebound and no guarding. Musculoskeletal: Normal range of motion. She exhibits no edema. Neurological: She is alert and oriented to person, place, and time. Skin: Skin is warm and dry. Nursing note and vitals reviewed.        MDM  Number of Diagnoses or Management Options  Diagnosis management comments: Patient is in atrial fibrillation with rapid ventricular response. She is currently anticoagulated and on eliquis. 20 of Cardizem ordered. I will repeat bolus of needed and start drip if I cannot get her rate under control. Her device will need interrogation to see if there was any ventricular tachycardia or ventricular fibrillation. Device may have fired inappropriately for this atrial fibrillation based on rate.        Amount and/or Complexity of Data Reviewed  Clinical lab tests: ordered and reviewed (Results for orders placed or performed during the hospital encounter of 03/20/18  -CBC WITH AUTOMATED DIFF       Result                                            Value                         Ref Range                       WBC                                               11.8 (H)                      4.3 - 11.1 K/uL                 RBC                                               5.25                          4.05 - 5.25 M/uL                HGB                                               13.4                          11.7 - 15.4 g/dL                HCT                                               42.1                          35.8 - 46.3 %                   MCV                                               80.2                          79.6 - 97.8 FL                  MCH                                               25.5 (L)                      26.1 - 32.9 PG                  MCHC                                              31.8                          31.4 - 35.0 g/dL                RDW                                               14.9 (H)                      11.9 - 14.6 %                   PLATELET                                          316                           150 - 450 K/uL                  MPV                                               10.6 (L)                      10.8 - 14.1 FL                  DF AUTOMATED                                                     NEUTROPHILS                                       67                            43 - 78 %                       LYMPHOCYTES                                       22                            13 - 44 %                       MONOCYTES                                         8                             4.0 - 12.0 %                    EOSINOPHILS                                       3                             0.5 - 7.8 %                     BASOPHILS                                         0                             0.0 - 2.0 %                     IMMATURE GRANULOCYTES                             0                             0.0 - 5.0 %                     ABS. NEUTROPHILS                                  7.8                           1.7 - 8.2 K/UL                  ABS. LYMPHOCYTES                                  2.6                           0.5 - 4.6 K/UL                  ABS. MONOCYTES                                    0.9                           0.1 - 1.3 K/UL                  ABS. EOSINOPHILS                                  0.4                           0.0 - 0.8 K/UL                  ABS. BASOPHILS                                    0.0                           0.0 - 0.2 K/UL                  ABS. IMM.  GRANS.                                  0.0                           0.0 - 0.5 K/UL             -METABOLIC PANEL, COMPREHENSIVE       Result                                            Value                         Ref Range                       Sodium                                            139                           136 - 145 mmol/L                Potassium                                         3.3 (L)                       3.5 - 5.1 mmol/L                Chloride                                          103                           98 - 107 mmol/L                 CO2                                               26 21 - 32 mmol/L                  Anion gap                                         10                            7 - 16 mmol/L                   Glucose                                           112 (H)                       65 - 100 mg/dL                  BUN                                               19                            6 - 23 MG/DL                    Creatinine                                        0.70                          0.6 - 1.0 MG/DL                 GFR est AA                                        >60                           >60 ml/min/1.73m2               GFR est non-AA                                    >60                           >60 ml/min/1.73m2               Calcium                                           8.5                           8.3 - 10.4 MG/DL                Bilirubin, total                                  0.6                           0.2 - 1.1 MG/DL                 ALT (SGPT)                                        47                            12 - 65 U/L                     AST (SGOT)                                        58 (H)                        15 - 37 U/L                     Alk. phosphatase                                  111                           50 - 136 U/L                    Protein, total                                    7.4                           6.3 - 8.2 g/dL                  Albumin                                           3.1 (L)                       3.5 - 5.0 g/dL                  Globulin                                          4.3 (H)                       2.3 - 3.5 g/dL                  A-G Ratio                                         0.7 (L)                       1.2 - 3.5                  -TROPONIN I       Result                                            Value                         Ref Range                       Troponin-I, Qt.                                   20.70 (HH)                    0.02 - 0.05 NG/ML          -BNP       Result                                            Value                         Ref Range                       BNP                                               1085                          pg/mL                      -PTT       Result                                            Value                         Ref Range                       aPTT                                              26.3                          23.2 - 35.3 SEC            -LIPID PANEL       Result                                            Value                         Ref Range                       LIPID PROFILE                                                                                                   Cholesterol, total                                105                           <200 MG/DL                      Triglyceride                                      91                            35 - 150 MG/DL                  HDL Cholesterol                                   20 (L)                        40 - 60 MG/DL                   LDL, calculated                                   66.8                          <100 MG/DL                      VLDL, calculated                                  18.2                          6.0 - 23.0 MG/DL                CHOL/HDL Ratio                                    5.3                                                      -TROPONIN I       Result                                            Value                         Ref Range                       Troponin-I, Qt.                                   18.10 (HH)                    0.02 - 0.05 NG/ML          -TROPONIN I       Result                                            Value                         Ref Range                       Troponin-I, Qt.                                   17.80 (HH)                    0.02 - 0.05 NG/ML          -METABOLIC PANEL, BASIC       Result                                            Value Ref Range                       Sodium                                            139                           136 - 145 mmol/L                Potassium                                         4.1                           3.5 - 5.1 mmol/L                Chloride                                          105                           98 - 107 mmol/L                 CO2                                               18 (L)                        21 - 32 mmol/L                  Anion gap                                         16                            7 - 16 mmol/L                   Glucose                                           150 (H)                       65 - 100 mg/dL                  BUN                                               19                            6 - 23 MG/DL                    Creatinine                                        0.91                          0.6 - 1.0 MG/DL                 GFR est AA                                        >60                           >60 ml/min/1.73m2               GFR est non-AA                                    >60                           >60 ml/min/1.73m2               Calcium                                           8.1 (L)                       8.3 - 10.4 MG/DL           -CBC W/O DIFF       Result                                            Value                         Ref Range                       WBC                                               11.3 (H)                      4.3 - 11.1 K/uL                 RBC                                               5.47 (H)                      4.05 - 5.25 M/uL                HGB                                               13.7                          11.7 - 15.4 g/dL                HCT                                               45.0                          35.8 - 46.3 %                   MCV                                               82.3                          79.6 - 97.8 FL                  MCH                                               25.0 (L)                      26.1 - 32.9 PG                  MCHC                                              30.4 (L)                      31.4 - 35.0 g/dL                RDW                                               15.3 (H)                      11.9 - 14.6 %                   PLATELET                                          330                           150 - 450 K/uL                  MPV                                               10.9                          10.8 - 14.1 FL             -PTT       Result                                            Value                         Ref Range                       aPTT                                              41.1 (H)                      23.2 - 35.3 SEC            -MAGNESIUM       Result                                            Value                         Ref Range                       Magnesium                                         1.7 (L)                       1.8 - 2.4 mg/dL            -PROTHROMBIN TIME + INR       Result                                            Value                         Ref Range                       Prothrombin time                                  18.0 (H)                      11.5 - 14.5 sec                 INR                                               1.5                                                      -METABOLIC PANEL, BASIC       Result                                            Value                         Ref Range                       Sodium                                            135 (L)                       136 - 145 mmol/L                Potassium                                         3.5                           3.5 - 5.1 mmol/L                Chloride                                          102                           98 - 107 mmol/L                 CO2                                               21 21 - 32 mmol/L                  Anion gap                                         12                            7 - 16 mmol/L                   Glucose                                           100                           65 - 100 mg/dL                  BUN                                               23                            6 - 23 MG/DL                    Creatinine                                        1.01 (H)                      0.6 - 1.0 MG/DL                 GFR est AA                                        >60                           >60 ml/min/1.73m2               GFR est non-AA                                    >60                           >60 ml/min/1.73m2               Calcium                                           8.2 (L)                       8.3 - 10.4 MG/DL           -CBC WITH AUTOMATED DIFF       Result                                            Value                         Ref Range                       WBC                                               14.9 (H)                      4.3 - 11.1 K/uL                 RBC                                               5.04                          4.05 - 5.25 M/uL                HGB                                               12.5                          11.7 - 15.4 g/dL                HCT                                               40.3                          35.8 - 46.3 %                   MCV                                               80.0                          79.6 - 97.8 FL                  MCH                                               24.8 (L)                      26.1 - 32.9 PG                  MCHC                                              31.0 (L)                      31.4 - 35.0 g/dL                RDW                                               15.1 (H)                      11.9 - 14.6 %                   PLATELET                                          339                           150 - 450 K/uL                  MPV                                               10.6 (L)                      10.8 - 14.1 FL                  DF                                                AUTOMATED                                                     NEUTROPHILS                                       69                            43 - 78 %                       LYMPHOCYTES                                       19                            13 - 44 %                       MONOCYTES                                         11                            4.0 - 12.0 %                    EOSINOPHILS                                       1                             0.5 - 7.8 %                     BASOPHILS                                         0                             0.0 - 2.0 %                     IMMATURE GRANULOCYTES                             0                             0.0 - 5.0 %                     ABS. NEUTROPHILS                                  10.1 (H)                      1.7 - 8.2 K/UL                  ABS. LYMPHOCYTES                                  2.8                           0.5 - 4.6 K/UL                  ABS. MONOCYTES                                    1.6 (H)                       0.1 - 1.3 K/UL                  ABS. EOSINOPHILS                                  0.2                           0.0 - 0.8 K/UL                  ABS. BASOPHILS                                    0.0                           0.0 - 0.2 K/UL                  ABS. IMM.  GRANS.                                  0.1                           0.0 - 0.5 K/UL             -DRUG SCREEN, URINE       Result                                            Value                         Ref Range                       PCP(PHENCYCLIDINE)                                NEGATIVE                                                      BENZODIAZEPINES                                   POSITIVE COCAINE                                           NEGATIVE                                                      AMPHETAMINES                                      POSITIVE                                                      METHADONE                                         POSITIVE                                                      THC (TH-CANNABINOL)                               NEGATIVE                                                      OPIATES                                           POSITIVE                                                      BARBITURATES                                      NEGATIVE                                                 -MAGNESIUM       Result                                            Value                         Ref Range                       Magnesium                                         2.3                           1.8 - 2.4 mg/dL            -PROTHROMBIN TIME + INR       Result                                            Value                         Ref Range                       Prothrombin time                                  18.2 (H)                      11.5 - 14.5 sec                 INR                                               1.6                                                      -METABOLIC PANEL, BASIC       Result                                            Value                         Ref Range                       Sodium                                            140                           136 - 145 mmol/L                Potassium                                         3.7                           3.5 - 5.1 mmol/L                Chloride                                          103                           98 - 107 mmol/L                 CO2                                               27                            21 - 32 mmol/L                  Anion gap                                         10                            7 - 16 mmol/L Glucose                                           74                            65 - 100 mg/dL                  BUN                                               17                            6 - 23 MG/DL                    Creatinine                                        0.88                          0.6 - 1.0 MG/DL                 GFR est AA                                        >60                           >60 ml/min/1.73m2               GFR est non-AA                                    >60                           >60 ml/min/1.73m2               Calcium                                           8.6                           8.3 - 10.4 MG/DL           -CBC WITH AUTOMATED DIFF       Result                                            Value                         Ref Range                       WBC                                               12.1 (H)                      4.3 - 11.1 K/uL                 RBC                                               4.88                          4.05 - 5.25 M/uL                HGB                                               12.0                          11.7 - 15.4 g/dL                HCT                                               39.4                          35.8 - 46.3 %                   MCV                                               80.7                          79.6 - 97.8 FL                  MCH                                               24.6 (L)                      26.1 - 32.9 PG                  MCHC                                              30.5 (L)                      31.4 - 35.0 g/dL                RDW                                               15.0 (H)                      11.9 - 14.6 %                   PLATELET                                          248                           150 - 450 K/uL                  MPV                                               10.7 (L)                      10.8 - 14.1 FL                  DF AUTOMATED                                                     NEUTROPHILS                                       70                            43 - 78 %                       LYMPHOCYTES                                       16                            13 - 44 %                       MONOCYTES                                         11                            4.0 - 12.0 %                    EOSINOPHILS                                       2                             0.5 - 7.8 %                     BASOPHILS                                         0                             0.0 - 2.0 %                     IMMATURE GRANULOCYTES                             1                             0.0 - 5.0 %                     ABS. NEUTROPHILS                                  8.5 (H)                       1.7 - 8.2 K/UL                  ABS. LYMPHOCYTES                                  2.0                           0.5 - 4.6 K/UL                  ABS. MONOCYTES                                    1.3                           0.1 - 1.3 K/UL                  ABS. EOSINOPHILS                                  0.2                           0.0 - 0.8 K/UL                  ABS. BASOPHILS                                    0.0                           0.0 - 0.2 K/UL                  ABS. IMM.  GRANS.                                  0.1                           0.0 - 0.5 K/UL             -MAGNESIUM       Result                                            Value                         Ref Range                       Magnesium                                         1.9                           1.8 - 2.4 mg/dL            -POC ACTIVATED CLOTTING TIME       Result                                            Value                         Ref Range                       Activated Clotting Time (POC)                     153 (H)                       70 - 128 SECS              -POC ACTIVATED CLOTTING TIME       Result                                            Value                         Ref Range                       Activated Clotting Time (POC)                     406 (H)                       70 - 128 SECS              -EKG, 12 LEAD, INITIAL       Result                                            Value                         Ref Range                       Ventricular Rate                                  160                           BPM                             Atrial Rate                                       170                           BPM                             QRS Duration                                      134                           ms                              Q-T Interval                                      340                           ms                              QTC Calculation (Bezet)                           554                           ms                              Calculated R Axis                                 62                            degrees                         Calculated T Axis                                 77                            degrees                         Diagnosis                                                                                                   Atrial fibrillation with rapid ventricular response   Right bundle branch block   Abnormal ECG   When compared with ECG of 07-NOV-2009 21:15,   Atrial fibrillation has replaced Sinus rhythm   Vent.  rate has increased BY  79 BPM   QRS axis Shifted right   Nonspecific T wave abnormality now evident in Inferior leads   T wave inversion more evident in Anterior leads   T wave inversion no longer evident in Lateral leads   Confirmed by NAHUM SEE (), Chad Vu ([id number]) on 3/21/2018 7:46:27 AM     -EKG, 12 LEAD, SUBSEQUENT       Result                                            Value                         Ref Range                       Ventricular Rate 50                            BPM                             Atrial Rate                                       50                            BPM                             P-R Interval                                      174                           ms                              QRS Duration                                      130                           ms                              Q-T Interval                                      504                           ms                              QTC Calculation (Bezet)                           459                           ms                              Calculated P Axis                                 12                            degrees                         Calculated R Axis                                 -52                           degrees                         Calculated T Axis                                 108                           degrees                         Diagnosis                                                                                                   Demand pacemaker; interpretation is based on intrinsic rhythm   Possible Failure to sense of atrial pacemaker   Sinus bradycardia with Fusion complexes   Left axis deviation   Right bundle branch block   Inferior infarct , age undetermined   T wave abnormality, consider lateral ischemia   Abnormal ECG   When compared with ECG of 20-MAR-2018 19:51,   Significant changes have occurred   Confirmed by [facility] DENISE SEE (), MILAGROS JOSEPH ([id number]) on 3/21/2018 12:17:23 PM     -EKG, 12 LEAD, INITIAL       Result                                            Value                         Ref Range                       Ventricular Rate                                  58                            BPM                             Atrial Rate                                       58                            BPM                             P-R Interval 194                           ms                              QRS Duration                                      118                           ms                              Q-T Interval                                      468                           ms                              QTC Calculation (Bezet)                           459                           ms                              Calculated P Axis                                 33                            degrees                         Calculated R Axis                                 -51                           degrees                         Calculated T Axis                                 92                            degrees                         Diagnosis                                                                                                   Sinus rhythm   Electronic ventricular pacemaker   When compared with ECG of 21-MAR-2018 11:48,   Electronic ventricular pacemaker has replaced Sinus rhythm   Confirmed by Luisa Pacheco MD (), MILAGROS JOSEPH ([id number]) on 3/22/2018 6:39:37 AM     )          ED Course       Procedures

## 2018-05-29 NOTE — H&P
[facility] Cardiology History & Physical      Date of  Admission: 5/28/2018 10:25 PM     Primary Care Physician: None  Primary Cardiologist: None (has not followed up)  Admitting Physician: Dr. Joann Greenwood    CC: AFIB w RVR, ICD shock    HPI:  Laina Palencia is a 47 y.o. female with past medical history of CAD with PCI to mid RCA with HARDEEP, substance abuse, tobacco abuse, ICD in place, HOCM, COPD and depression who presented to the ER with complaints of palpitations and ICD shock. She denies chest pain or shortness of breath. Upon arrival to the ER, patient was found to be tachycardic with HR of 171. EKG confirmed AFIB w RVR. While in the ER, she was given 1mg IV ativan and 20mg IV cardizem x2 followed by Cardizem drip. She admits to using meth over the past several days, last time was on 5/27. Also admits that she has not been compliant with medications including Xarelto and Plavix. Review of records show that [facility] tried to get in touch with patient earlier this month due to episodes of NSVT and AFIB w RVR seen by remote access. She has been lost to follow-up from [facility] since 2015 according to their records. She was scheduled for follow-up with Dr. Ksenia Lema in April after PCI, but did not show up. Social history -- smoke 1/2 to 1ppd, admits to recent meth use.       Past Medical History:   Diagnosis Date    Anemia 2/2011    and CHF; hospitalization at [facility] Atrial fibrillation with rapid ventricular response ([facility] [address] 3/20/2018    COPD     Depression     Essential hypertension 3/21/2018    Hypertrophic cardiomyopathy ([address]     NSTEMI (non-ST elevated myocardial infarction) ([address] 3/21/2018    Obesity     Sleep disorder 7/12/2013      Past Surgical History:   Procedure Laterality Date    CARDIAC SURG PROCEDURE UNLIST  2008    cardiac cath, defib    HX BREAST LUMPECTOMY  1995    right    HX CHOLECYSTECTOMY  1989    HX IMPLANTABLE CARDIOVERTER DEFIBRILLATOR  2008 PM    HX PACEMAKER  2008    pacemaker with defibrillator, septal ablation    HX TUBAL LIGATION  1992       Allergies   Allergen Reactions    Sulfa (Sulfonamide Antibiotics) Itching      Social History     Social History    Marital status: LEGALLY      Spouse name: N/A    Number of children: N/A    Years of education: N/A     Occupational History    Not on file. Social History Main Topics    Smoking status: Current Every Day Smoker     Packs/day: 0.50     Years: 30.00     Types: Cigarettes    Smokeless tobacco: Never Used    Alcohol use No      Comment: once a month    Drug use: No    Sexual activity: Yes     Partners: Male     Birth control/ protection: Surgical     Other Topics Concern    Not on file     Social History Narrative     from  and lives alone. No pets. She is disabled, previously worked in restaurant management. Family History   Problem Relation Age of Onset    Heart Disease Mother     Heart Attack Mother 50     massive MI    Diabetes Father     Heart Disease Father     Heart Disease Maternal Grandmother     Malignant Hyperthermia Neg Hx     Pseudocholinesterase Deficiency Neg Hx     Delayed Awakening Neg Hx     Post-op Nausea/Vomiting Neg Hx     Emergence Delirium Neg Hx     Post-op Cognitive Dysfunction Neg Hx     Other Neg Hx         Current Facility-Administered Medications   Medication Dose Route Frequency    dilTIAZem (CARDIZEM) 100 mg in 0.9% sodium chloride (MBP/ADV) 100 mL infusion  5-15 mg/hr IntraVENous TITRATE       Review of Systems    Review of Systems   Cardiovascular: Positive for palpitations. Psychiatric/Behavioral: Positive for depression and substance abuse.           Subjective:     Visit Vitals    /74    Pulse 95    Temp 98.2 °F (36.8 °C)    Resp 17    Ht 5' 2\" (1.575 m)    Wt 70.3 kg (155 lb)    SpO2 94%    BMI 28.35 kg/m2     Physical Exam   Constitutional: She is oriented to person, place, and time and well-developed, well-nourished, and in no distress. Cardiovascular: An irregularly irregular rhythm present. Tachycardia present. Pulmonary/Chest: Effort normal and breath sounds normal.   Abdominal: Soft. Bowel sounds are normal.   Musculoskeletal: Normal range of motion. Neurological: She is alert and oriented to person, place, and time. Skin: Skin is warm. Psychiatric: Affect normal.       Cardiographics  Telemetry: AFIB  ECG: atrial fibrillation, rate 171  Echocardiogram: ordered/pending    Labs:   Recent Results (from the past 24 hour(s))   EKG, 12 LEAD, INITIAL    Collection Time: 05/28/18 10:34 PM   Result Value Ref Range    Ventricular Rate 164 BPM    Atrial Rate 129 BPM    QRS Duration 130 ms    Q-T Interval 294 ms    QTC Calculation (Bezet) 485 ms    Calculated R Axis -75 degrees    Calculated T Axis 101 degrees    Diagnosis       !! AGE AND GENDER SPECIFIC ECG ANALYSIS !! Atrial fibrillation with rapid ventricular response  Right bundle branch block  Left anterior fascicular block  !!! Bifascicular block !!! Left ventricular hypertrophy with QRS widening  T wave abnormality, consider lateral ischemia or digitalis effect  Abnormal ECG  When compared with ECG of 21-MAR-2018 22:07,  Atrial fibrillation has replaced Electronic ventricular pacemaker  Vent. rate has increased  BPM     CBC WITH AUTOMATED DIFF    Collection Time: 05/28/18 10:45 PM   Result Value Ref Range    WBC 9.6 4.3 - 11.1 K/uL    RBC 5.69 (H) 4.05 - 5.25 M/uL    HGB 14.8 11.7 - 15.4 g/dL    HCT 45.6 35.8 - 46.3 %    MCV 80.1 79.6 - 97.8 FL    MCH 26.0 (L) 26.1 - 32.9 PG    MCHC 32.5 31.4 - 35.0 g/dL    RDW 18.6 (H) 11.9 - 14.6 %    PLATELET 77 (L) 397 - 450 K/uL    MPV Cannot be calculated 10.8 - 14.1 FL    DF AUTOMATED      NEUTROPHILS 59 43 - 78 %    LYMPHOCYTES 26 13 - 44 %    MONOCYTES 11 4.0 - 12.0 %    EOSINOPHILS 4 0.5 - 7.8 %    BASOPHILS 0 0.0 - 2.0 %    IMMATURE GRANULOCYTES 0 0.0 - 5.0 %    ABS.  NEUTROPHILS 5.6 1.7 - 8.2 K/UL    ABS. LYMPHOCYTES 2.5 0.5 - 4.6 K/UL    ABS. MONOCYTES 1.1 0.1 - 1.3 K/UL    ABS. EOSINOPHILS 0.4 0.0 - 0.8 K/UL    ABS. BASOPHILS 0.0 0.0 - 0.2 K/UL    ABS. IMM. GRANS. 0.0 0.0 - 0.5 K/UL   METABOLIC PANEL, COMPREHENSIVE    Collection Time: 05/28/18 10:45 PM   Result Value Ref Range    Sodium 142 136 - 145 mmol/L    Potassium 4.8 3.5 - 5.1 mmol/L    Chloride 108 (H) 98 - 107 mmol/L    CO2 25 21 - 32 mmol/L    Anion gap 9 7 - 16 mmol/L    Glucose 118 (H) 65 - 100 mg/dL    BUN 12 6 - 23 MG/DL    Creatinine 0.83 0.6 - 1.0 MG/DL    GFR est AA >60 >60 ml/min/1.73m2    GFR est non-AA >60 >60 ml/min/1.73m2    Calcium 8.6 8.3 - 10.4 MG/DL    Bilirubin, total 0.5 0.2 - 1.1 MG/DL    ALT (SGPT) 40 12 - 65 U/L    AST (SGOT) 65 (H) 15 - 37 U/L    Alk. phosphatase 136 50 - 136 U/L    Protein, total 7.8 6.3 - 8.2 g/dL    Albumin 3.2 (L) 3.5 - 5.0 g/dL    Globulin 4.6 (H) 2.3 - 3.5 g/dL    A-G Ratio 0.7 (L) 1.2 - 3.5     MAGNESIUM    Collection Time: 05/28/18 10:45 PM   Result Value Ref Range    Magnesium 1.9 1.8 - 2.4 mg/dL   TSH 3RD GENERATION    Collection Time: 05/28/18 10:45 PM   Result Value Ref Range    TSH 2.740 0.358 - 3.740 uIU/mL   POC TROPONIN-I    Collection Time: 05/28/18 10:46 PM   Result Value Ref Range    Troponin-I (POC) 0.09 (H) 0.02 - 0.05 ng/ml       Patient has been seen and examined by Dr. Rody Munoz and he agrees with the following assessment and plan:     Assessment/Plan:        Atrial fibrillation with RVR -- admit to telemetry. Continue IV Cardizem for rate control and Xarelto for anticoagulation. Was started on sotalol after LHC in 3/2018, but this was stopped duet o history of cocaine abuse and noncompliance with medications. Tobacco use disorder -- cessation discussed and encouraged. Overview: Smoking current      Coronary artery disease involving native coronary artery of native heart -- last LHC was in 3/2018 with 3.5 x 26-mm Zay HARDEEP.  Was prescribed Xarelto/Plavix, BB and statin therapy. Reports that she has not been taking as prescribed. Restart medications. Essential hypertension -- continue previously prescribed medications. Monitor BP closely. Titrate medications as needed. Dyslipidemia -- continue prescribed statin. Methamphetamine abuse -- UDS pending. admits to multiple uses in the past several days. Consult hospitalist for possible withdrawal. Consult social work for discharge planning. Importance of cessation discussed and encouraged. Jenni Nam NP  5/29/2018 1:01 AM    I have personally seen and examined patient and agree with above assessment. I agree and confirm with findings with additional details/exceptions as listed below:    Presented with ICD shock; noted in afib with RVR. Likely inappropriate shock but also with reported prior VT per S records. Issues with substance abuse. Prior attempted on sotalol which was stopped with methadone use (risk of QTc prolongation and substance abuse). Also likely non-compliance with meds although currently states compliant with meds. Last echo with preserved EF (3/18; mod to sev MR). PCI to [facility] -3/18. Continue plavix/xarelto. Once device interrogation complete, will reassess options. Consideration for antiarrhythmics. Also plan repeat limited echo with prior noted MR degree; may be difficult to achieve rhythm control if worsening degree; also reassess EF. Extensively stressed need for refraining from substance abuse/tobacco abuse. Further recommendations pending clinical course.       Flory Sanford MD  5/29/2018  7:50 AM

## 2018-05-29 NOTE — PROGRESS NOTES
CM met with patient to discuss d/c plan. Patient states she returning home. Patient states she live alone in an apartment with no steps to enter into the home. Patient states she's independent and do care for herself. Patient plan to return home with no needs identified. Care Management Interventions  PCP Verified by CM: Yes (Bailey Amanda MD)  Mode of Transport at Discharge:  Other (see comment) (Daughter Jerryn Raul))  Transition of Care Consult (CM Consult): Discharge Planning  Discharge Durable Medical Equipment: No  Physical Therapy Consult: No  Occupational Therapy Consult: No  Speech Therapy Consult: No  Current Support Network: Lives Alone, Family Lives [place] (Jose Carter) lives nearby)  Confirm Follow Up Transport: Family (Daughter Giancarlo Carter))  Plan discussed with Pt/Family/Caregiver: Yes  Freedom of Choice Offered: Yes  [place] Resource Information Provided?: No  Discharge Location  Discharge Placement: Home

## 2018-05-29 NOTE — CONSULTS
History and Physical    Subjective:     Evangelist Jiménez is a 47 y.o. female, with a pmh of atrial fibrillation, who presented last night to the cardiology service for palpitations and feeling a shock from her ICD and was found to be in atrial fibrillation with RVR with a HR in the 170s. Currently on cardizem drip with controlled heart rate. Consulted by cardiology for methamphetamine abuse. She endorses last using 5/27. Also smokes daily. No current chest pain, sob, nausea, fever, chills.      Past Medical History:   Diagnosis Date    Anemia 2/2011    and CHF; hospitalization at [facility] Atrial fibrillation with rapid ventricular response ([facility] [address] 3/20/2018    COPD     Depression     Essential hypertension 3/21/2018    Hypertrophic cardiomyopathy ([facility] [address] [facility]     NSTEMI (non-ST elevated myocardial infarction) ([facility] [address] 3/21/2018    Obesity     Sleep disorder 7/12/2013      Past Surgical History:   Procedure Laterality Date    CARDIAC SURG PROCEDURE UNLIST  2008    cardiac cath, defib    HX BREAST LUMPECTOMY  1995    right    HX CHOLECYSTECTOMY  1989    HX IMPLANTABLE CARDIOVERTER DEFIBRILLATOR  2008    PM    HX PACEMAKER  2008    pacemaker with defibrillator, septal ablation    HX TUBAL LIGATION  1992     Family History   Problem Relation Age of Onset    Heart Disease Mother     Heart Attack Mother 50     massive MI    Diabetes Father     Heart Disease Father     Heart Disease Maternal Grandmother     Malignant Hyperthermia Neg Hx     Pseudocholinesterase Deficiency Neg Hx     Delayed Awakening Neg Hx     Post-op Nausea/Vomiting Neg Hx     Emergence Delirium Neg Hx     Post-op Cognitive Dysfunction Neg Hx     Other Neg Hx       Social History   Substance Use Topics    Smoking status: Current Every Day Smoker     Packs/day: 0.50     Years: 30.00     Types: Cigarettes    Smokeless tobacco: Never Used    Alcohol use No      Comment: once a month       Prior to Admission medications Medication Sig Start Date End Date Taking? Authorizing Provider   busPIRone (BUSPAR) 10 mg tablet Take 1 Tab by mouth three (3) times daily (with meals). 3/23/18  Yes LACY Dumont   atorvastatin (LIPITOR) 80 mg tablet Take 1 Tab by mouth nightly. 3/23/18  Yes LACY Dumont   metoprolol succinate (TOPROL-XL) 50 mg XL tablet Take 1 Tab by mouth daily. 3/23/18  Yes LACY Dumont   albuterol (PROVENTIL HFA, VENTOLIN HFA, PROAIR HFA) 90 mcg/actuation inhaler Take 2 Puffs by inhalation every six (6) hours as needed for Wheezing. 3/23/18  Yes LACY Dumont   rivaroxaban (XARELTO) 15 mg tab tablet Take 1 Tab by mouth daily (with dinner). 3/23/18  Yes LACY Dumont   clopidogrel (PLAVIX) 75 mg tab Take 1 Tab by mouth daily. 3/23/18  Yes LACY Dumont   nitroglycerin (NITROSTAT) 0.4 mg SL tablet 1 Tab by SubLINGual route every five (5) minutes as needed for Chest Pain. 3/23/18  Yes LACY Dumont     Allergies   Allergen Reactions    Sulfa (Sulfonamide Antibiotics) Itching        Review of Systems:  A comprehensive review of systems was negative except for that written in the History of Present Illness. Objective: Intake and Output:    05/29 0701 - 05/29 1900  In: 120 [P.O.:120]  Out: -   05/27 1901 - 05/29 0700  In: 120 [P.O.:120]  Out: 400 [Urine:400]    Physical Exam:     General: asleep but arousable, calm, oriented  Eyes; non icteric, EOMI  Neck; supple  CV: IRIR, normal rate  Pulm; CTAB  Abd; soft, non tender, active BS    Data Review:   Recent Results (from the past 24 hour(s))   EKG, 12 LEAD, INITIAL    Collection Time: 05/28/18 10:34 PM   Result Value Ref Range    Ventricular Rate 164 BPM    Atrial Rate 129 BPM    QRS Duration 130 ms    Q-T Interval 294 ms    QTC Calculation (Bezet) 485 ms    Calculated R Axis -75 degrees    Calculated T Axis 101 degrees    Diagnosis       !! AGE AND GENDER SPECIFIC ECG ANALYSIS !!   Atrial fibrillation with rapid ventricular response  Right bundle branch block  Left anterior fascicular block  !!! Bifascicular block !!! Left ventricular hypertrophy with QRS widening  T wave abnormality, consider lateral ischemia or digitalis effect  Abnormal ECG  When compared with ECG of 21-MAR-2018 22:07,  Atrial fibrillation has replaced Electronic ventricular pacemaker  Vent. rate has increased  BPM  Confirmed by NAHUM SEE (), Kirsty Contreras ([id number]) on 5/29/2018 10:25:13 AM     CBC WITH AUTOMATED DIFF    Collection Time: 05/28/18 10:45 PM   Result Value Ref Range    WBC 9.6 4.3 - 11.1 K/uL    RBC 5.69 (H) 4.05 - 5.25 M/uL    HGB 14.8 11.7 - 15.4 g/dL    HCT 45.6 35.8 - 46.3 %    MCV 80.1 79.6 - 97.8 FL    MCH 26.0 (L) 26.1 - 32.9 PG    MCHC 32.5 31.4 - 35.0 g/dL    RDW 18.6 (H) 11.9 - 14.6 %    PLATELET 77 (L) 723 - 450 K/uL    MPV Cannot be calculated 10.8 - 14.1 FL    DF AUTOMATED      NEUTROPHILS 59 43 - 78 %    LYMPHOCYTES 26 13 - 44 %    MONOCYTES 11 4.0 - 12.0 %    EOSINOPHILS 4 0.5 - 7.8 %    BASOPHILS 0 0.0 - 2.0 %    IMMATURE GRANULOCYTES 0 0.0 - 5.0 %    ABS. NEUTROPHILS 5.6 1.7 - 8.2 K/UL    ABS. LYMPHOCYTES 2.5 0.5 - 4.6 K/UL    ABS. MONOCYTES 1.1 0.1 - 1.3 K/UL    ABS. EOSINOPHILS 0.4 0.0 - 0.8 K/UL    ABS. BASOPHILS 0.0 0.0 - 0.2 K/UL    ABS. IMM. GRANS. 0.0 0.0 - 0.5 K/UL   METABOLIC PANEL, COMPREHENSIVE    Collection Time: 05/28/18 10:45 PM   Result Value Ref Range    Sodium 142 136 - 145 mmol/L    Potassium 4.8 3.5 - 5.1 mmol/L    Chloride 108 (H) 98 - 107 mmol/L    CO2 25 21 - 32 mmol/L    Anion gap 9 7 - 16 mmol/L    Glucose 118 (H) 65 - 100 mg/dL    BUN 12 6 - 23 MG/DL    Creatinine 0.83 0.6 - 1.0 MG/DL    GFR est AA >60 >60 ml/min/1.73m2    GFR est non-AA >60 >60 ml/min/1.73m2    Calcium 8.6 8.3 - 10.4 MG/DL    Bilirubin, total 0.5 0.2 - 1.1 MG/DL    ALT (SGPT) 40 12 - 65 U/L    AST (SGOT) 65 (H) 15 - 37 U/L    Alk.  phosphatase 136 50 - 136 U/L    Protein, total 7.8 6.3 - 8.2 g/dL    Albumin 3.2 (L) 3.5 - 5.0 g/dL    Globulin 4.6 (H) 2.3 - 3.5 g/dL    A-G Ratio 0.7 (L) 1.2 - 3.5     MAGNESIUM    Collection Time: 05/28/18 10:45 PM   Result Value Ref Range    Magnesium 1.9 1.8 - 2.4 mg/dL   TSH 3RD GENERATION    Collection Time: 05/28/18 10:45 PM   Result Value Ref Range    TSH 2.740 0.358 - 3.740 uIU/mL   POC TROPONIN-I    Collection Time: 05/28/18 10:46 PM   Result Value Ref Range    Troponin-I (POC) 0.09 (H) 0.02 - 0.05 ng/ml   DRUG SCREEN, URINE    Collection Time: 05/29/18  1:15 AM   Result Value Ref Range    PCP(PHENCYCLIDINE) NEGATIVE       BENZODIAZEPINES NEGATIVE       COCAINE NEGATIVE       AMPHETAMINES POSITIVE      METHADONE NEGATIVE       THC (TH-CANNABINOL) NEGATIVE       OPIATES NEGATIVE       BARBITURATES NEGATIVE            Assessment:     Principal Problem:    Atrial fibrillation with RVR ([address] (5/29/2018)    Active Problems:    Tobacco use disorder (7/12/2013)      Overview: Smoking current      Coronary artery disease involving native coronary artery of native heart (3/21/2018)      Overview: 03/2018:  3.5x26 [place] mRCA      Essential hypertension (3/21/2018)      Dyslipidemia (3/21/2018)      Methamphetamine abuse (5/29/2018)        Plan:     Tobacco cessation emphasized. Continue nicotine patch. Methamphetamine use cessation emphasized. Patient may experience headaches or some psychiatric issues from lack of methamphetamine withdrawal if she frequently uses but meth is unlike opiates/alcohol in that she will not have life threatening withdrawal.  May symptomatically treat with tylenol if headache occurs. Please call with any questions.      Signed By: Kade Aly MD     May 29, 2018

## 2018-05-29 NOTE — PROGRESS NOTES
Problem: Afib Pathway: Day 1  Goal: *Optimal pain control at patient's stated goal  Outcome: Resolved/Met Date Met: 05/29/18  Denies pain or SOB      Goal: *Hemodynamically stable  Outcome: Progressing Towards Goal  Remains A fi with rates 100-120's. Cardizem gtt infusing as per MAR. BP stable. Goal: *Lungs clear or at baseline  Outcome: Resolved/Met Date Met: 05/29/18  Lungs clear      Goal: *Describes available resources and support systems  Outcome: Resolved/Met Date Met: 05/29/18  Male friend at bedside        Problem: Falls - Risk of  Goal: *Absence of Falls  Document Kya Lucero Fall Risk and appropriate interventions in the flowsheet.    Outcome: Progressing Towards Goal  Fall Risk Interventions:  Mobility Interventions: Bed/chair exit alarm, Communicate number of staff needed for ambulation/transfer         Medication Interventions: Bed/chair exit alarm, Evaluate medications/consider consulting pharmacy, Patient to call before getting OOB    Elimination Interventions: Bed/chair exit alarm, Call light in reach, Elevated toilet seat, Toilet paper/wipes in reach

## 2018-05-29 NOTE — PROGRESS NOTES
Bedside and Verbal shift change report given to Abdiaziz Waldron, RN / Diana De Souza RN (oncoming nurse) by self [address]). Report included the following information SBAR, Kardex, MAR and Recent Results. Cardizem gtt infusing as ordered with BP cycling hourly. Bed alarm on and functioning.

## 2018-05-29 NOTE — PROGRESS NOTES
Bedside and Verbal shift change report given to self (oncoming nurse) by Tg Trujillo / Fuad Camacho RN (offgoing nurse). Report included the following information SBAR, Kardex, MAR and Recent Results.

## 2018-05-30 LAB
ANION GAP SERPL CALC-SCNC: 10 MMOL/L (ref 7–16)
BUN SERPL-MCNC: 15 MG/DL (ref 6–23)
CALCIUM SERPL-MCNC: 8.6 MG/DL (ref 8.3–10.4)
CHLORIDE SERPL-SCNC: 108 MMOL/L (ref 98–107)
CHOLEST SERPL-MCNC: 126 MG/DL
CO2 SERPL-SCNC: 24 MMOL/L (ref 21–32)
CREAT SERPL-MCNC: 0.6 MG/DL (ref 0.6–1)
ERYTHROCYTE [DISTWIDTH] IN BLOOD BY AUTOMATED COUNT: 19.2 % (ref 11.9–14.6)
GLUCOSE SERPL-MCNC: 99 MG/DL (ref 65–100)
HCT VFR BLD AUTO: 43.2 % (ref 35.8–46.3)
HCT VFR BLD AUTO: 45.5 % (ref 35.8–46.3)
HDLC SERPL-MCNC: 39 MG/DL (ref 40–60)
HDLC SERPL: 3.2 {RATIO}
HGB BLD-MCNC: 13.7 G/DL (ref 11.7–15.4)
HGB BLD-MCNC: 14.2 G/DL (ref 11.7–15.4)
LDLC SERPL CALC-MCNC: 62 MG/DL
LIPID PROFILE,FLP: ABNORMAL
MAGNESIUM SERPL-MCNC: 2 MG/DL (ref 1.8–2.4)
MCH RBC QN AUTO: 25.9 PG (ref 26.1–32.9)
MCHC RBC AUTO-ENTMCNC: 31.7 G/DL (ref 31.4–35)
MCV RBC AUTO: 81.8 FL (ref 79.6–97.8)
PLATELET # BLD AUTO: 239 K/UL (ref 150–450)
PMV BLD AUTO: 10.9 FL (ref 10.8–14.1)
POTASSIUM SERPL-SCNC: 4 MMOL/L (ref 3.5–5.1)
RBC # BLD AUTO: 5.28 M/UL (ref 4.05–5.25)
SODIUM SERPL-SCNC: 142 MMOL/L (ref 136–145)
TRIGL SERPL-MCNC: 125 MG/DL (ref 35–150)
VLDLC SERPL CALC-MCNC: 25 MG/DL (ref 6–23)
WBC # BLD AUTO: 11.6 K/UL (ref 4.3–11.1)

## 2018-05-30 PROCEDURE — 99218 HC RM OBSERVATION: CPT

## 2018-05-30 PROCEDURE — 80048 BASIC METABOLIC PNL TOTAL CA: CPT | Performed by: NURSE PRACTITIONER

## 2018-05-30 PROCEDURE — 74011000250 HC RX REV CODE- 250: Performed by: EMERGENCY MEDICINE

## 2018-05-30 PROCEDURE — 74011000258 HC RX REV CODE- 258: Performed by: NURSE PRACTITIONER

## 2018-05-30 PROCEDURE — 36415 COLL VENOUS BLD VENIPUNCTURE: CPT | Performed by: NURSE PRACTITIONER

## 2018-05-30 PROCEDURE — 74011250637 HC RX REV CODE- 250/637: Performed by: NURSE PRACTITIONER

## 2018-05-30 PROCEDURE — 74011000258 HC RX REV CODE- 258: Performed by: EMERGENCY MEDICINE

## 2018-05-30 PROCEDURE — 80061 LIPID PANEL: CPT | Performed by: NURSE PRACTITIONER

## 2018-05-30 PROCEDURE — 74011000250 HC RX REV CODE- 250: Performed by: NURSE PRACTITIONER

## 2018-05-30 PROCEDURE — 85027 COMPLETE CBC AUTOMATED: CPT | Performed by: NURSE PRACTITIONER

## 2018-05-30 PROCEDURE — 83735 ASSAY OF MAGNESIUM: CPT | Performed by: NURSE PRACTITIONER

## 2018-05-30 RX ORDER — METOPROLOL SUCCINATE 100 MG/1
100 TABLET, EXTENDED RELEASE ORAL DAILY
Status: DISCONTINUED | OUTPATIENT
Start: 2018-05-31 | End: 2018-05-31

## 2018-05-30 RX ADMIN — BUSPIRONE HYDROCHLORIDE 10 MG: 5 TABLET ORAL at 12:22

## 2018-05-30 RX ADMIN — BUSPIRONE HYDROCHLORIDE 10 MG: 5 TABLET ORAL at 17:33

## 2018-05-30 RX ADMIN — RIVAROXABAN 15 MG: 15 TABLET, FILM COATED ORAL at 17:33

## 2018-05-30 RX ADMIN — METOPROLOL SUCCINATE 50 MG: 50 TABLET, EXTENDED RELEASE ORAL at 09:13

## 2018-05-30 RX ADMIN — ATORVASTATIN CALCIUM 80 MG: 40 TABLET, FILM COATED ORAL at 21:16

## 2018-05-30 RX ADMIN — Medication 1 AMPULE: at 21:16

## 2018-05-30 RX ADMIN — BUSPIRONE HYDROCHLORIDE 10 MG: 5 TABLET ORAL at 09:13

## 2018-05-30 RX ADMIN — CLOPIDOGREL BISULFATE 75 MG: 75 TABLET ORAL at 09:13

## 2018-05-30 RX ADMIN — SODIUM CHLORIDE 15 MG/HR: 900 INJECTION, SOLUTION INTRAVENOUS at 01:30

## 2018-05-30 RX ADMIN — Medication 1 AMPULE: at 09:13

## 2018-05-30 RX ADMIN — DILTIAZEM HYDROCHLORIDE 10 MG/HR: 5 INJECTION INTRAVENOUS at 09:35

## 2018-05-30 RX ADMIN — ASPIRIN 81 MG 81 MG: 81 TABLET ORAL at 09:13

## 2018-05-30 NOTE — PROGRESS NOTES
Notified by monitor room that -140's. Afib c RVR. BP stable. Patient in bed eating dinner visiting with family. Cardizem infusing at 2.5cc/hr. Cardizem gtt titrated per MAR with max of 15mg/hr. Will continue to monitor. 05/29/18 2115   Vital Signs   Pulse (Heart Rate) (!) 135   Heart Rate Source Monitor   Cardiac Rhythm A Fib   /78   MAP (Calculated) 90   Patient Observation   Observations Afib c RVR noted on tele.   Cardizem gtt titrated   Activity In bed;Family/Visitors present

## 2018-05-30 NOTE — PROGRESS NOTES
Hourly BP and HR monitored throughout night while on Cardizem gtt. Readings from [place] printed and placed on bedside chart.

## 2018-05-30 NOTE — PROGRESS NOTES
5/30/2018 10:16 AM    Admit Date: 5/28/2018        Subjective:     Bertha Calle denies chest pain, dyspnea, palpitations. Has not been compliant Had ICD by cardiology at [facility] has not been back Has h/o PAF and was on betapace in past but due to drug abuse taken off She terrance she was at [facility] who wanted to an ablation? Objective:      Visit Vitals    /59    Pulse 68    Temp 98 °F (36.7 °C)    Resp 17    Ht 5' 2\" (1.575 m)    Wt 59 kg (130 lb)    SpO2 95%    Breastfeeding No    BMI 23.78 kg/m2       Physical Exam:  Heart: irregularly irregular rhythm  Lungs: clear to auscultation bilaterally  Abdomen: soft, non-tender.  Bowel sounds normal. No masses,  no organomegaly  Extremities: extremities normal, atraumatic, no cyanosis or edema    Data Review:   Labs:    Recent Results (from the past 24 hour(s))   HGB & HCT    Collection Time: 05/29/18 11:39 PM   Result Value Ref Range    HGB 14.2 11.7 - 15.4 g/dL    HCT 45.5 35.8 - 46.3 %   LIPID PANEL    Collection Time: 05/30/18  4:10 AM   Result Value Ref Range    LIPID PROFILE          Cholesterol, total 126 <200 MG/DL    Triglyceride 125 35 - 150 MG/DL    HDL Cholesterol 39 (L) 40 - 60 MG/DL    LDL, calculated 62 <100 MG/DL    VLDL, calculated 25 (H) 6.0 - 23.0 MG/DL    CHOL/HDL Ratio 3.2     METABOLIC PANEL, BASIC    Collection Time: 05/30/18  4:10 AM   Result Value Ref Range    Sodium 142 136 - 145 mmol/L    Potassium 4.0 3.5 - 5.1 mmol/L    Chloride 108 (H) 98 - 107 mmol/L    CO2 24 21 - 32 mmol/L    Anion gap 10 7 - 16 mmol/L    Glucose 99 65 - 100 mg/dL    BUN 15 6 - 23 MG/DL    Creatinine 0.60 0.6 - 1.0 MG/DL    GFR est AA >60 >60 ml/min/1.73m2    GFR est non-AA >60 >60 ml/min/1.73m2    Calcium 8.6 8.3 - 10.4 MG/DL   CBC W/O DIFF    Collection Time: 05/30/18  4:10 AM   Result Value Ref Range    WBC 11.6 (H) 4.3 - 11.1 K/uL    RBC 5.28 (H) 4.05 - 5.25 M/uL    HGB 13.7 11.7 - 15.4 g/dL    HCT 43.2 35.8 - 46.3 %    MCV 81.8 79.6 - 97.8 FL MCH 25.9 (L) 26.1 - 32.9 PG    MCHC 31.7 31.4 - 35.0 g/dL    RDW 19.2 (H) 11.9 - 14.6 %    PLATELET 798 293 - 555 K/uL    MPV 10.9 10.8 - 14.1 FL   MAGNESIUM    Collection Time: 05/30/18  4:10 AM   Result Value Ref Range    Magnesium 2.0 1.8 - 2.4 mg/dL       Telemetry: AFIB rate controlled on IV cardiazem          Assessment:     Patient Active Problem List    Diagnosis Date Noted    Atrial fibrillation with RVR ([facility] [address] will try to control rate ?  Options increase toprol 05/29/2018    Methamphetamine abuse 05/29/2018    Coronary artery disease involving native coronary artery of native heart stable 03/21/2018    Essential hypertension 03/21/2018    Dyslipidemia 03/21/2018    Hypertrophic obstructive cardiomyopathy (HCC) increase beta blocker 03/20/2018    Elevated troponin 03/20/2018    Family history of hypertrophic cardiomyopathy 03/20/2018    Obesity 07/12/2013    Anxiety 07/12/2013    Tobacco use disorder 07/12/2013    Depression 07/12/2013    Cardiomyopathy ([facility] [address] 07/12/2013    COPD (chronic obstructive pulmonary disease) ([facility] [address] 07/12/2013    Sleep disorder 07/12/2013    Debility 07/12/2013    Toxic effect of tobacco(989.84) 07/12/2013       Plan:   Increase toprol taper cardiazem drip

## 2018-05-30 NOTE — PHYSICIAN ADVISORY
Letter of Determination:  Outpatient states receiving Observation Services    This case was reviewed, and I concur with Outpatient status receiving Observation services. This determination may change depending on further medical information, condition changes of the patient, or treatment requirements.       Homar Villeda MD, PRAMOD,   Physician [facility].

## 2018-05-30 NOTE — PROGRESS NOTES
Bedside and Verbal shift change report given to René Harper RN / Ian Jaimes RN (oncoming nurse) by self [address]). Report included the following information SBAR, Kardex, MAR and Recent Results.

## 2018-05-30 NOTE — PROGRESS NOTES
Bedside and Verbal shift change report given to self (oncoming nurse) by Parmjit Baez (offgoing nurse). Report included the following information SBAR, Kardex, MAR and Recent Results.

## 2018-05-30 NOTE — PROGRESS NOTES
Chart reviewed and seen 5-29-18 per Dr Rita Magaña, no other acute medical issues that need to be addressed thus patient not seen and will not bill for this encounter, will sign off and be available as needed , thanks  Otoniel Pitt MD

## 2018-05-31 LAB
ATRIAL RATE: 150 BPM
CALCULATED R AXIS, ECG10: -66 DEGREES
CALCULATED T AXIS, ECG11: 91 DEGREES
DIAGNOSIS, 93000: NORMAL
Q-T INTERVAL, ECG07: 426 MS
QRS DURATION, ECG06: 156 MS
QTC CALCULATION (BEZET), ECG08: 494 MS
VENTRICULAR RATE, ECG03: 81 BPM

## 2018-05-31 PROCEDURE — 65660000000 HC RM CCU STEPDOWN

## 2018-05-31 PROCEDURE — 74011250637 HC RX REV CODE- 250/637: Performed by: NURSE PRACTITIONER

## 2018-05-31 PROCEDURE — 99218 HC RM OBSERVATION: CPT

## 2018-05-31 PROCEDURE — 74011000258 HC RX REV CODE- 258: Performed by: INTERNAL MEDICINE

## 2018-05-31 PROCEDURE — 74011250637 HC RX REV CODE- 250/637: Performed by: INTERNAL MEDICINE

## 2018-05-31 PROCEDURE — 93005 ELECTROCARDIOGRAM TRACING: CPT | Performed by: INTERNAL MEDICINE

## 2018-05-31 PROCEDURE — 77030012341 HC CHMB SPCR OPTC MDI VYRM -A

## 2018-05-31 PROCEDURE — 74011000250 HC RX REV CODE- 250: Performed by: INTERNAL MEDICINE

## 2018-05-31 RX ORDER — DILTIAZEM HYDROCHLORIDE 60 MG/1
60 TABLET, FILM COATED ORAL EVERY 6 HOURS
Status: DISCONTINUED | OUTPATIENT
Start: 2018-05-31 | End: 2018-06-01

## 2018-05-31 RX ADMIN — BUSPIRONE HYDROCHLORIDE 10 MG: 5 TABLET ORAL at 11:31

## 2018-05-31 RX ADMIN — DILTIAZEM HYDROCHLORIDE 5 MG/HR: 5 INJECTION INTRAVENOUS at 09:19

## 2018-05-31 RX ADMIN — METOPROLOL SUCCINATE 100 MG: 100 TABLET, EXTENDED RELEASE ORAL at 08:45

## 2018-05-31 RX ADMIN — CLOPIDOGREL BISULFATE 75 MG: 75 TABLET ORAL at 08:45

## 2018-05-31 RX ADMIN — DILTIAZEM HYDROCHLORIDE 60 MG: 60 TABLET, FILM COATED ORAL at 17:53

## 2018-05-31 RX ADMIN — DILTIAZEM HYDROCHLORIDE 60 MG: 60 TABLET, FILM COATED ORAL at 11:31

## 2018-05-31 RX ADMIN — BUSPIRONE HYDROCHLORIDE 10 MG: 5 TABLET ORAL at 17:53

## 2018-05-31 RX ADMIN — BUSPIRONE HYDROCHLORIDE 10 MG: 5 TABLET ORAL at 08:46

## 2018-05-31 RX ADMIN — ATORVASTATIN CALCIUM 80 MG: 40 TABLET, FILM COATED ORAL at 21:08

## 2018-05-31 RX ADMIN — Medication 1 AMPULE: at 08:47

## 2018-05-31 RX ADMIN — Medication 1 AMPULE: at 21:08

## 2018-05-31 RX ADMIN — RIVAROXABAN 15 MG: 15 TABLET, FILM COATED ORAL at 17:52

## 2018-05-31 RX ADMIN — Medication 10 ML: at 17:57

## 2018-05-31 RX ADMIN — Medication 10 ML: at 21:09

## 2018-05-31 RX ADMIN — ASPIRIN 81 MG 81 MG: 81 TABLET ORAL at 09:00

## 2018-05-31 NOTE — PROGRESS NOTES
Patient has been upgraded from observation to inpatient. In accordance with Medicare guidelines, a copy of the Important Letter from Medicare was presented to the patient. One copy was given to the patient, and a signed copy was placed in the patients chart.

## 2018-05-31 NOTE — PROGRESS NOTES
Verbal bedside report given to Omar Rodriguez oncoming RN. Patient's situation, background, assessment and recommendations provided. Opportunity for questions provided. Oncoming RN assumed care of patient.

## 2018-05-31 NOTE — CONSULTS
[facility] Cardiology Consult                Date of  Admission: 5/28/2018 10:25 PM     Primary Care Physician: Dr. Ana Peralta  Primary Cardiologist: None  Referring Physician: Dr. Daniel Brantley Physician: Dr. Mesha Gilliam    CC/Reason for consult: likely inappropriate shocks, ? Need for A. Flutter ablation vs consideration for AAD, and if any device changes needed. Misty Valente is a 47 y.o. female with prior h/o CAD with PCI to mid RCA with HARDEEP (3/18), substance abuse, tobacco abuse, ICD in place, HOCM, COPD and depression who presented to the ER with complaints of palpitations and ICD shock. She denies chest pain or shortness of breath. Upon arrival to the ER, patient was found to be tachycardic with HR of 171. EKG confirmed AFIB w RVR. While in the ER, she was given 1mg IV ativan and 20mg IV cardizem x2 followed by Cardizem drip. She admits to using meth over the past several days, last time was on 5/27. Also admits that she has not been compliant with medications including Xarelto and Plavix. Review of records show that [facility] tried to get in touch with patient earlier this month due to episodes of NSVT and AFIB w RVR seen by remote access. She has been lost to follow-up from [facility] since 2015 according to their records. She was scheduled for follow-up with Dr. Victoriano Parker in April after PCI, but did not show up. Dr. Lucien Sanders saw patient this AM, and device interrogation was reviewed showing likely A. Flutter with inappropriate shocks. EP cardiology consulted.      Diagnosis    Obesity    Anxiety    Tobacco use disorder    Depression    Cardiomyopathy ([address]    COPD (chronic obstructive pulmonary disease) ([address]    Sleep disorder    Debility    Toxic effect of tobacco(989.84)    Hypertrophic obstructive cardiomyopathy ([address]    Elevated troponin    Family history of hypertrophic cardiomyopathy    Coronary artery disease involving native coronary artery of native heart    Essential hypertension    Dyslipidemia    Atrial fibrillation with RVR (HCC)    Methamphetamine abuse       Past Medical History:   Diagnosis Date    Anemia 2/2011    and CHF; hospitalization at [facility] Atrial fibrillation with rapid ventricular response ([facility] [address] 3/20/2018    COPD     Depression     Essential hypertension 3/21/2018    Hypertrophic cardiomyopathy ([facility] [address]     NSTEMI (non-ST elevated myocardial infarction) ([facility] [address] 3/21/2018    Obesity     Sleep disorder 7/12/2013      Past Surgical History:   Procedure Laterality Date    CARDIAC SURG PROCEDURE UNLIST  2008    cardiac cath, defib    HX BREAST LUMPECTOMY  1995    right    HX CHOLECYSTECTOMY  1989    HX IMPLANTABLE CARDIOVERTER DEFIBRILLATOR  2008    PM    HX PACEMAKER  2008    pacemaker with defibrillator, septal ablation    HX TUBAL LIGATION  1992     Allergies   Allergen Reactions    Sulfa (Sulfonamide Antibiotics) Itching      Family History   Problem Relation Age of Onset    Heart Disease Mother     Heart Attack Mother 50     massive MI    Diabetes Father     Heart Disease Father     Heart Disease Maternal Grandmother     Malignant Hyperthermia Neg Hx     Pseudocholinesterase Deficiency Neg Hx     Delayed Awakening Neg Hx     Post-op Nausea/Vomiting Neg Hx     Emergence Delirium Neg Hx     Post-op Cognitive Dysfunction Neg Hx     Other Neg Hx         Current Facility-Administered Medications   Medication Dose Route Frequency    dilTIAZem (CARDIZEM) 125 mg in dextrose 5% 125 mL infusion  0-15 mg/hr IntraVENous TITRATE    metoprolol succinate (TOPROL-XL) tablet 100 mg  100 mg Oral DAILY    atorvastatin (LIPITOR) tablet 80 mg  80 mg Oral QHS    busPIRone (BUSPAR) tablet 10 mg  10 mg Oral TID WITH MEALS    clopidogrel (PLAVIX) tablet 75 mg  75 mg Oral DAILY    rivaroxaban (XARELTO) tablet 15 mg  15 mg Oral DAILY WITH DINNER    sodium chloride (NS) flush 5-10 mL  5-10 mL IntraVENous Q8H    sodium chloride (NS) flush 5-10 mL  5-10 mL IntraVENous PRN    aspirin chewable tablet 81 mg  81 mg Oral DAILY    nitroglycerin (NITROSTAT) tablet 0.4 mg  0.4 mg SubLINGual Q5MIN PRN    acetaminophen (TYLENOL) tablet 650 mg  650 mg Oral Q4H PRN    ondansetron (ZOFRAN) injection 4 mg  4 mg IntraVENous Q4H PRN    nicotine (NICODERM CQ) 14 mg/24 hr patch 1 Patch  1 Patch TransDERmal DAILY    alcohol 62% (NOZIN) nasal  1 Ampule  1 Ampule Topical Q12H       Review of Systems   Constitution: Negative for diaphoresis, weakness and malaise/fatigue. HENT: Negative for congestion. Cardiovascular: Positive for palpitations (on admission with ICD shock). Negative for chest pain, claudication, cyanosis, dyspnea on exertion, irregular heartbeat, leg swelling, near-syncope, orthopnea, paroxysmal nocturnal dyspnea and syncope. Respiratory: Negative for cough, shortness of breath and wheezing. Endocrine: Negative for cold intolerance and heat intolerance. Hematologic/Lymphatic: Does not bruise/bleed easily. Skin: Negative for nail changes. Neurological: Negative for dizziness and headaches.         Physical Exam  Vitals:    05/30/18 2100 05/31/18 0109 05/31/18 0547 05/31/18 0820   BP: 115/78 118/66 (!) 105/91 130/67   Pulse: 75 (!) 102 96 87   Resp: 18 18 18 18   Temp: 97.4 °F (36.3 °C) 97.8 °F (36.6 °C) 99 °F (37.2 °C) 96.5 °F (35.8 °C)   SpO2: 98% 96% 95% 94%   Weight:   59.3 kg (130 lb 11.2 oz)    Height:           Physical Exam:  General: Well Developed, Well Nourished, No Acute Distress  HEENT: pupils equal and round, no abnormalities noted  Neck: supple, no JVD, no carotid bruits  Heart: S1S2 slightly irregular with RRR without murmurs or gallops  Lungs: Clear throughout auscultation bilaterally without adventitious sounds  Abd: soft, nontender, nondistended, with good bowel sounds  Ext: warm, no edema, calves supple/nontender, pulses 2+ bilaterally  Skin: warm and dry  Psychiatric: Normal mood and affect  Neurologic: Alert and oriented X 3    Cardiographics    Telemetry: ? A. Flutter  ECG: ? A. Flutter  Echocardiogram: -  Left ventricle: There is moderate to severe hypokinesis of the basal   Septum (prior alcohol septal ablation). Systolic function was normal. Ejection fraction was estimated in the range of 55 % to 60 %. -  Left atrium: The atrium was moderately to markedly dilated. -  Mitral valve: There was mild to moderate annular calcification. There was moderate regurgitation. Labs:   Recent Labs      05/30/18   0410  05/29/18   2339  05/28/18   2245   NA  142   --   142   K  4.0   --   4.8   MG  2.0   --   1.9   BUN  15   --   12   CREA  0.60   --   0.83   GLU  99   --   118*   WBC  11.6*   --   9.6   HGB  13.7  14.2  14.8   HCT  43.2  45.5  45.6   PLT  239   --   77*   TRIGL  125   --    --    HDL  39*   --    --         Assessment/Plan:     Assessment:      Principal Problem:    Atrial fibrillation with RVR (HCC) (5/29/2018)       --continue a rate control strategy for afib at this point, concerned about active drug use, lack of follow up and noncompliance, will have device programming changed to decrease risk of inappropriate shocks, cont xarelto (reduced dose based on [place] data), patient will need outpatient follow for further discussion    Active Problems:    Tobacco use disorder (7/12/2013)      Overview: Smoking current, instructed on the importance of tobacco cessation      Coronary artery disease involving native coronary artery of native heart (3/21/2018)      Overview: 03/2018:  3.5x26 [place] mRCA       --no active chest pain, cont current meds, plavix, statin      Essential hypertension (3/21/2018)       --controlled, cont current meds      Dyslipidemia (3/21/2018)      Methamphetamine abuse (5/29/2018)       --instructed on the importance of cessation of drugs    Thank you very much for this referral. We appreciate the opportunity to participate in this patient's care.  We will follow along with above stated plan.    Consulting MD: Dr. Nevin Palmer

## 2018-05-31 NOTE — PROGRESS NOTES
Verbal bedside report received from Carolyn Betancourt RN. Assumed care of patient. Cardizem IV drip verified at bedside with outgoing RN.

## 2018-05-31 NOTE — PHYSICIAN ADVISORY
Letter of Determination: Upgrade from Observation to Inpatient Status    This patient was originally hospitalized as Outpatient Status with Observation Services on 5/28/2018 for atrial fibrillation with rapid ventricular response. This patient now meets for Inpatient Admission based on medical necessity. The patient's stay was medically necessary based on heart rate to 172 beats per minute, treated with intravenous diltiazem by continuous infusion for more than 48 hours. It is our recommendation that this patient's hospitalization status should be upgraded from OBSERVATION to INPATIENT status.      The final decision regarding the patient's hospitalization status depends on the attending physician's judgement.     Sharyle Barges MD, PRAMOD,   Physician [place].

## 2018-05-31 NOTE — PROGRESS NOTES
Bedside shift change report given to [facility], RNs (oncoming nurse) by self (offgoing nurse). Report included the following information SBAR, Kardex, MAR and Recent Results.

## 2018-05-31 NOTE — PROGRESS NOTES
[facility] CARDIOLOGY PROGRESS NOTE           5/31/2018 10:13 AM    Admit Date: 5/28/2018      Subjective:     No o/e. On cardizem gtt at 5mg/hr. Telemetry appears with atrial flutter; HR ~    ROS:  Cardiovascular:  As noted above    Objective:      Vitals:    05/30/18 2100 05/31/18 0109 05/31/18 0547 05/31/18 0820   BP: 115/78 118/66 (!) 105/91 130/67   Pulse: 75 (!) 102 96 87   Resp: 18 18 18 18   Temp: 97.4 °F (36.3 °C) 97.8 °F (36.6 °C) 99 °F (37.2 °C) 96.5 °F (35.8 °C)   SpO2: 98% 96% 95% 94%   Weight:   59.3 kg (130 lb 11.2 oz)    Height:           Physical Exam:  General-No Acute Distress  Neck- supple, no JVD  CV- irregular rate and rhythm no MRG  Lung- clear bilaterally  Abd- soft, nontender, nondistended  Ext- no edema bilaterally. Skin- warm and dry    Data Review:   Recent Labs      05/30/18   0410  05/29/18   2339  05/28/18   2245   NA  142   --   142   K  4.0   --   4.8   MG  2.0   --   1.9   BUN  15   --   12   CREA  0.60   --   0.83   GLU  99   --   118*   WBC  11.6*   --   9.6   HGB  13.7  14.2  14.8   HCT  43.2  45.5  45.6   PLT  239   --   77*   CHOL  126   --    --    LDLC  62   --    --    HDL  39*   --    --        Assessment/Plan:     Principal Problem:    Atrial fibrillation with RVR (HCC) (5/29/2018)    Active Problems:    Tobacco use disorder (7/12/2013)      Coronary artery disease involving native coronary artery of native heart (3/21/2018)      Essential hypertension (3/21/2018)      Dyslipidemia (3/21/2018)      Methamphetamine abuse (5/29/2018)    Device therapy appears inappropriate secondary to RVR with likely atrial flutter; prior EKG from 3/18 also appears possibly flutter with variable conduction. Consideration for ablation with prior hx of device shocks; possibly inappropriate but [facility]. Will ask EP to evaluate for possible ablation options with noted flutter. Change toprol XL to po cardizem; avoid using BB with frequent meth use. Consideration for AAD but appears issues with compliance. Some prior reported non sustained VT per [facility] records recently. Issues with substance abuse. Prior attempted on sotalol which was stopped with methadone use (risk of QTc prolongation and substance abuse). Echo with preserved EF and mod MR. PCI to [facility] -3/18. Continue plavix/xarelto. Extensively stressed need for refraining from substance abuse/tobacco abuse. Further recommendations pending clinical course.         Sanju Jensen MD  5/31/2018 10:13 AM

## 2018-05-31 NOTE — PROGRESS NOTES
Problem: Afib Pathway: Day 2  Goal: Activity/Safety  Outcome: Progressing Towards Goal  Pt up ad joe. Goal: Medications  Outcome: Progressing Towards Goal  Pt off of IV cardizem and on oral cardizem   Goal: Treatments/Interventions/Procedures  Outcome: Progressing Towards Goal  NPO after midnight for possible ablation in the morning. Unsure if it will take place.

## 2018-05-31 NOTE — PROGRESS NOTES
Spoke with Dr. Joann Greenwood. Pt is to be kept NPO after midnight tonight in case there is a need for a cardioversion in the morning. Pt is aware.   She has also been started on cardizem orally and IV CDZM has been stopped

## 2018-05-31 NOTE — PROGRESS NOTES
Bedside and Verbal shift change report given to self (oncoming nurse) by Noelle Madrigal RN (offgoing nurse). Report included the following information SBAR, Kardex, MAR and Recent Results.

## 2018-06-01 VITALS
OXYGEN SATURATION: 96 % | RESPIRATION RATE: 18 BRPM | BODY MASS INDEX: 23.7 KG/M2 | DIASTOLIC BLOOD PRESSURE: 55 MMHG | SYSTOLIC BLOOD PRESSURE: 103 MMHG | HEART RATE: 69 BPM | HEIGHT: 62 IN | TEMPERATURE: 97.9 F | WEIGHT: 128.8 LBS

## 2018-06-01 PROCEDURE — 74011250637 HC RX REV CODE- 250/637: Performed by: NURSE PRACTITIONER

## 2018-06-01 PROCEDURE — 74011250637 HC RX REV CODE- 250/637: Performed by: INTERNAL MEDICINE

## 2018-06-01 RX ORDER — DILTIAZEM HYDROCHLORIDE 240 MG/1
240 CAPSULE, COATED, EXTENDED RELEASE ORAL DAILY
Status: DISCONTINUED | OUTPATIENT
Start: 2018-06-01 | End: 2018-06-01 | Stop reason: HOSPADM

## 2018-06-01 RX ORDER — DILTIAZEM HYDROCHLORIDE 240 MG/1
240 CAPSULE, COATED, EXTENDED RELEASE ORAL DAILY
Qty: 30 CAP | Refills: 11 | Status: SHIPPED | OUTPATIENT
Start: 2018-06-01

## 2018-06-01 RX ADMIN — Medication 10 ML: at 05:28

## 2018-06-01 RX ADMIN — DILTIAZEM HYDROCHLORIDE 60 MG: 60 TABLET, FILM COATED ORAL at 05:27

## 2018-06-01 RX ADMIN — Medication 1 AMPULE: at 08:12

## 2018-06-01 RX ADMIN — DILTIAZEM HYDROCHLORIDE 60 MG: 60 TABLET, FILM COATED ORAL at 00:44

## 2018-06-01 RX ADMIN — CLOPIDOGREL BISULFATE 75 MG: 75 TABLET ORAL at 08:13

## 2018-06-01 RX ADMIN — BUSPIRONE HYDROCHLORIDE 10 MG: 5 TABLET ORAL at 08:13

## 2018-06-01 RX ADMIN — DILTIAZEM HYDROCHLORIDE 240 MG: 240 CAPSULE, COATED, EXTENDED RELEASE ORAL at 09:14

## 2018-06-01 RX ADMIN — ASPIRIN 81 MG 81 MG: 81 TABLET ORAL at 08:13

## 2018-06-01 NOTE — PROGRESS NOTES
Problem: Falls - Risk of  Goal: *Absence of Falls  Document Abi Fall Risk and appropriate interventions in the flowsheet.    Outcome: Progressing Towards Goal  Fall Risk Interventions:  Mobility Interventions: Patient to call before getting OOB         Medication Interventions: Patient to call before getting OOB    Elimination Interventions: Call light in reach

## 2018-06-01 NOTE — DISCHARGE INSTRUCTIONS
Diltiazem (By mouth)   Diltiazem ([id number]-[id number])  Treats high blood pressure and angina (chest pain). This medicine is a calcium channel blocker. Brand Name(s): Cardizem, Cardizem CD, Cardizem LA, Cartia XT, Dilt-XR, Matzim LA, Taztia XT, Tiazac   There may be other brand names for this medicine. When This Medicine Should Not Be Used: This medicine is not right for everyone. Do not use it if you had an allergic reaction to diltiazem or similar medicines. How to Use This Medicine:   Long Acting Capsule, 12 Hour Capsule, 24 Hour Capsule, Tablet, Long Acting Tablet  · Take your medicine as directed. Your dose may need to be changed several times to find what works best for you. · It is best to take this medicine on an empty stomach. · Swallow this medicine whole. Do not crush, break, chew, or open the capsule or tablet. · Missed dose: Take a dose as soon as you remember. If it is almost time for your next dose, wait until then and take a regular dose. Do not take extra medicine to make up for a missed dose. · Store the medicine in a closed container at room temperature, away from heat, moisture, and direct light. Drugs and Foods to Avoid:   Ask your doctor or pharmacist before using any other medicine, including over-the-counter medicines, vitamins, and herbal products. · Some medicines can affect how diltiazem works. Tell your doctor if you are using the following:  ¨ Buspirone, carbamazepine, cimetidine, clonidine, cyclosporine, digoxin, ivabradine, lovastatin, midazolam, quinidine, rifampin, simvastatin, triazolam  ¨ Other blood pressure medicine, including a beta-blocker  . Warnings While Using This Medicine:   · Tell your doctor if you are pregnant or breastfeeding, or if you have kidney disease, liver disease, or digestion problems. Tell your doctor about all heart problems that you have, including heart failure or rhythm problems.   · This medicine may cause the following problems:  ¨ Slow heartbeat  ¨ Worsening of heart failure  ¨ Serious skin reactions  · This medicine could lower your blood pressure too much, especially when you first use it or if you are dehydrated. Stand or sit up slowly if you feel lightheaded or dizzy. Do not drive or do anything else that could be dangerous until you know how this medicine affects you. · Do not stop using this medicine without asking your doctor, even if you feel well. This medicine will not cure high blood pressure, but it will help keep it in normal range. You may have to take blood pressure medicine for the rest of your life. · Your doctor will do lab tests at regular visits to check on the effects of this medicine. Keep all appointments. · Keep all medicine out of the reach of children. Never share your medicine with anyone. Possible Side Effects While Using This Medicine:   Call your doctor right away if you notice any of these side effects:  · Allergic reaction: Itching or hives, swelling in your face or hands, swelling or tingling in your mouth or throat, chest tightness, trouble breathing  · Blistering, peeling, red skin rash  · Dark urine or pale stools, nausea, vomiting, loss of appetite, stomach pain, yellow skin or eyes  · Fast, slow, uneven, or pounding heartbeat  · Rapid weight gain, swelling in your hands, ankles, or feet  If you notice other side effects that you think are caused by this medicine, tell your doctor. Call your doctor for medical advice about side effects. You may report side effects to FDA at 9-439-FDA-5004  © 2017 [address]  Information is for End User's use only and may not be sold, redistributed or otherwise used for commercial purposes. The above information is an  only. It is not intended as medical advice for individual conditions or treatments. Talk to your doctor, nurse or pharmacist before following any medical regimen to see if it is safe and effective for you.        Atrial Fibrillation: Care Instructions  Your Care Instructions    Atrial fibrillation is an irregular and often fast heartbeat. Treating this condition is important for several reasons. It can cause blood clots, which can travel from your heart to your brain and cause a stroke. If you have a fast heartbeat, you may feel lightheaded, dizzy, and weak. An irregular heartbeat can also increase your risk for heart failure. Atrial fibrillation is often the result of another heart condition, such as high blood pressure or coronary artery disease. Making changes to improve your heart condition will help you stay healthy and active. Follow-up care is a key part of your treatment and safety. Be sure to make and go to all appointments, and call your doctor if you are having problems. It's also a good idea to know your test results and keep a list of the medicines you take. How can you care for yourself at home? Medicines  ? · Take your medicines exactly as prescribed. Call your doctor if you think you are having a problem with your medicine. You will get more details on the specific medicines your doctor prescribes. ? · If your doctor has given you a blood thinner to prevent a stroke, be sure you get instructions about how to take your medicine safely. Blood thinners can cause serious bleeding problems. ? · Do not take any vitamins, over-the-counter drugs, or herbal products without talking to your doctor first.   ? Lifestyle changes  ? · Do not smoke. Smoking can increase your chance of a stroke and heart attack. If you need help quitting, talk to your doctor about stop-smoking programs and medicines. These can increase your chances of quitting for good. ? · Eat a heart-healthy diet. ? · Stay at a healthy weight. Lose weight if you need to.   ? · Limit alcohol to 2 drinks a day for men and 1 drink a day for women. Too much alcohol can cause health problems. ? · Avoid colds and flu. Get a pneumococcal vaccine shot.  If you have had one before, ask your doctor whether you need another dose. Get a flu shot every year. If you must be around people with colds or flu, wash your hands often. Activity  ? · If your doctor recommends it, get more exercise. Walking is a good choice. Bit by bit, increase the amount you walk every day. Try for at least 30 minutes on most days of the week. You also may want to swim, bike, or do other activities. Your doctor may suggest that you join a cardiac rehabilitation program so that you can have help increasing your physical activity safely. ? · Start light exercise if your doctor says it is okay. Even a small amount will help you get stronger, have more energy, and manage stress. Walking is an easy way to get exercise. Start out by walking a little more than you did in the hospital. Gradually increase the amount you walk. ? · When you exercise, watch for signs that your heart is working too hard. You are pushing too hard if you cannot talk while you are exercising. If you become short of breath or dizzy or have chest pain, sit down and rest immediately. ? · Check your pulse regularly. Place two fingers on the artery at the palm side of your wrist, in line with your thumb. If your heartbeat seems uneven or fast, talk to your doctor. When should you call for help? Call 911 anytime you think you may need emergency care. For example, call if:  ? · You have symptoms of a heart attack. These may include:  ¨ Chest pain or pressure, or a strange feeling in the chest.  ¨ Sweating. ¨ Shortness of breath. ¨ Nausea or vomiting. ¨ Pain, pressure, or a strange feeling in the back, neck, jaw, or upper belly or in one or both shoulders or arms. ¨ Lightheadedness or sudden weakness. ¨ A fast or irregular heartbeat. After you call 911, the  may tell you to chew 1 adult-strength or 2 to 4 low-dose aspirin. Wait for an ambulance. Do not try to drive yourself. ? · You have symptoms of a stroke. These may include:  ¨ Sudden numbness, tingling, weakness, or loss of movement in your face, arm, or leg, especially on only one side of your body. ¨ Sudden vision changes. ¨ Sudden trouble speaking. ¨ Sudden confusion or trouble understanding simple statements. ¨ Sudden problems with walking or balance. ¨ A sudden, severe headache that is different from past headaches. ? · You passed out (lost consciousness). ?Call your doctor now or seek immediate medical care if:  ? · You have new or increased shortness of breath. ? · You feel dizzy or lightheaded, or you feel like you may faint. ? · Your heart rate becomes irregular. ? · You can feel your heart flutter in your chest or skip heartbeats. Tell your doctor if these symptoms are new or worse. ? Watch closely for changes in your health, and be sure to contact your doctor if you have any problems. Where can you learn more? Go to http://juanita-mj.info/. Enter U020 in the search box to learn more about \"Atrial Fibrillation: Care Instructions. \"  Current as of: September 21, 2016  Content Version: 11.4  © 4105-3482 [employer]. Care instructions adapted under license by [employer] (which disclaims liability or warranty for this information). If you have questions about a medical condition or this instruction, always ask your healthcare professional. [place] [address] any warranty or liability for your use of this information.

## 2018-06-01 NOTE — PROGRESS NOTES
I have reviewed discharge instructions, patient medications, patient prescriptions, and activity restrictions with the patient. The patient verbalized understanding.  Patient was stable at discharge    Primary Nurse has removed IV's and Monitor has been taken off

## 2018-06-01 NOTE — PROGRESS NOTES
Bedside and Verbal shift change report given to Jayson Beal RN (oncoming nurse) by self Burneverette Sinning nurse). Report included the following information SBAR, Kardex, MAR and Recent Results.

## 2018-06-01 NOTE — PROGRESS NOTES
[facility] CARDIOLOGY PROGRESS NOTE           6/1/2018 7:13 AM    Admit Date: 5/28/2018      Subjective:     No o/e. Much improved HRs on po cardizem; HR ~70. No complaints    ROS:  Cardiovascular:  As noted above    Objective:      Vitals:    05/31/18 2105 06/01/18 0044 06/01/18 0109 06/01/18 0502   BP: 104/74 119/52 100/65 114/78   Pulse: 89 (!) 104 99 88   Resp: 16  16 16   Temp: 98.2 °F (36.8 °C)  98.2 °F (36.8 °C) 97.8 °F (36.6 °C)   SpO2: 97%  98% 95%   Weight:    58.4 kg (128 lb 12.8 oz)   Height:           Physical Exam:  General-No Acute Distress  Neck- supple, no JVD  CV- irregular rate and rhythm no MRG  Lung- clear bilaterally  Abd- soft, nontender, nondistended  Ext- no edema bilaterally. Skin- warm and dry    Data Review:   Recent Labs      05/30/18   0410  05/29/18   2339   NA  142   --    K  4.0   --    MG  2.0   --    BUN  15   --    CREA  0.60   --    GLU  99   --    WBC  11.6*   --    HGB  13.7  14.2   HCT  43.2  45.5   PLT  239   --    CHOL  126   --    LDLC  62   --    HDL  39*   --        Assessment/Plan:     Principal Problem:    Atrial fibrillation with RVR ([address] (5/29/2018)    Active Problems:    Tobacco use disorder (7/12/2013)      Coronary artery disease involving native coronary artery of native heart (3/21/2018)      Essential hypertension (3/21/2018)      Dyslipidemia (3/21/2018)      Methamphetamine abuse (5/29/2018)    Stable with no issues. Much improved rates with cardizem. Plan home today with outpt f/u. Device therapy appears inappropriate secondary to RVR with likely atrial flutter; prior EKG from 3/18 also appears possibly flutter with variable conduction. Consideration for ablation with prior hx of device shocks; possibly inappropriate but all prior care at [facility]. EP evaluation noted; assess compliance with outpt f/u and reassess. Currently well controlled on cardizem IR and change to cardizem LA 240mg daily. Avoid using BB with frequent meth use. Consideration for AAD long term but issues with compliance. Issues with substance abuse. Prior attempted on sotalol which was stopped with methadone use (risk of QTc prolongation and substance abuse). Echo with preserved EF and mod MR. PCI to [facility] -3/18. Continue plavix/xarelto (15mg with [place]IONE[place] data; hold ASA). Extensively stressed need for refraining from substance abuse/tobacco abuse and also compliance.        Radha Dawson MD  6/1/2018 7:13 AM

## 2018-06-01 NOTE — PROGRESS NOTES
Bedside and Verbal shift change report given to self (oncoming nurse) by Saeed Hassan RN (offgoing nurse). Report included the following information SBAR, Kardex, MAR and Recent Results.

## 2018-06-01 NOTE — DISCHARGE SUMMARY
[facility] Cardiology Discharge Summary     Patient ID:  Kitty Garcia  [id number]  59 y.o.  1965    Admit date: 5/28/2018    Discharge date:  6/1/18    Admitting Physician: Irma Dodge MD     Discharge Physician: Praveena Jeffrey NP/Dr. Silvia Long    Admission Diagnoses: Atrial fibrillation with RVR [facility])  Atrial fibrillation with RVR [facility])    Discharge Diagnoses:   Patient Active Problem List    Diagnosis Date Noted    Atrial fibrillation with RVR ([facility] 05/29/2018    Methamphetamine abuse 05/29/2018    Coronary artery disease involving native coronary artery of native heart 03/21/2018    Essential hypertension 03/21/2018    Dyslipidemia 03/21/2018    Hypertrophic obstructive cardiomyopathy ([facility] 03/20/2018    Elevated troponin 03/20/2018    Family history of hypertrophic cardiomyopathy 03/20/2018    Obesity 07/12/2013    Anxiety 07/12/2013    Tobacco use disorder 07/12/2013    Depression 07/12/2013    Cardiomyopathy ([facility] 07/12/2013    COPD (chronic obstructive pulmonary disease) ([facility] 07/12/2013    Sleep disorder 07/12/2013    Debility 07/12/2013    Toxic effect of tobacco(989.84) 07/12/2013       Cardiology Procedures this admission:  EchoCardiogram  and device interrogation     Consults: None    Hospital Course: Patient presented to the emergency department of [facility] with complaints of palpitations and ICD shock. She denies chest pain or shortness of breath. Upon arrival to the ER, patient was found to be tachycardic with HR of 171. EKG confirmed AFIB w RVR. While in the ER, she was given 1mg IV ativan and 20mg IV cardizem x2 followed by Cardizem drip. She admits to using meth over the past several days, last time was on 5/27. Also admits that she has not been compliant with medications including Xarelto and Plavix.  Review of records show that [facility] tried to get in touch with patient earlier this month due to episodes of NSVT and AFIB w RVR seen by remote access. She has been lost to follow-up from [facility] since 2015 according to their records. She was scheduled for follow-up with Dr. Mariaelena Chino in April after PCI, but did not show up. She was on sotalol in past but stopped d/t methadone use (risk of QTc prolongation and substance abuse). The patient was admitted to telemetry and Cardizem drip was continued. The patient's regular home meds were also restarted. An echocardiogram showed -  Left ventricle: There is moderate to severe hypokinesis of the basal septum (prior alcohol septal ablation). Systolic function was normal. Ejection fraction was estimated in the range of 55 % to 60 %. -  Left atrium: The atrium was moderately to markedly dilated. -  Mitral valve: There was mild to moderate annular calcification. There was moderate regurgitation. The patient's device was interrogated showing likely inappropriate shock d/t A. Flutter. The patient's prior EKGs from 3/18 also appears possibly flutter with variable conduction. Consideration for ablation with prior hx of device shocks; possibly inappropriate but all prior care at [facility]. EP evaluation noted; assess compliance with outpt f/u and reassess. Currently well controlled on cardizem IR and change to cardizem LA 240mg daily prior to discharge. Avoid using BB with frequent meth use. Consideration for AAD long term but issues with compliance. The patient will continue plavix/xarelto (15mg with [place] data; but hold ASA). The morning of 6/1/18, the patient was feeling much better. The patient was seen and examined by Dr. Marina Mooney and was determined stable and ready for discharge home. The patient was instructed on the importance of medication compliance and outpatient follow up. The patient will follow up with [facility] Dr. Caity Gonzalez on 8/2/18 at 80 am in [facility] office.     DISPOSITION: The patient is being discharged home in stable condition on a low saturated fat, low cholesterol and low salt diet. The patient is instructed to advance activities as tolerated to the limit of fatigue or shortness of breath. The patient is instructed to call the office or return to the ER for immediate evaluation for any severe shortness of breath, chest pain, prolonged palpitations, near syncope or syncope. Discharge Exam:   Visit Vitals    /78 (BP 1 Location: Right arm, BP Patient Position: At rest;Head of bed elevated (Comment degrees))    Pulse 88    Temp 97.8 °F (36.6 °C)    Resp 16    Ht 5' 2\" (1.575 m)    Wt 58.4 kg (128 lb 12.8 oz)    SpO2 95%    Breastfeeding No    BMI 23.56 kg/m2     Patient has been seen by Dr. Jane Shelton: see his progress note for exam details. Recent Results (from the past 24 hour(s))   EKG, 12 LEAD, SUBSEQUENT    Collection Time: 05/31/18 10:41 AM   Result Value Ref Range    Ventricular Rate 81 BPM    Atrial Rate 150 BPM    QRS Duration 156 ms    Q-T Interval 426 ms    QTC Calculation (Bezet) 494 ms    Calculated R Axis -66 degrees    Calculated T Axis 91 degrees    Diagnosis       Atrial fibrillation  Right bundle branch block  Left anterior fascicular block  !!! Bifascicular block !!!  Minimal voltage criteria for LVH, may be normal variant  T wave abnormality, consider lateral ischemia  Abnormal ECG  When compared with ECG of 28-MAY-2018 22:34,  Vent. rate has decreased BY  83 BPM  QRS duration has increased  Confirmed by NAHUM SEE (), Mayankeverette Ridley ([id number]) on 5/31/2018 12:59:07 PM           Patient Instructions:   Current Discharge Medication List      START taking these medications    Details   dilTIAZem CD (CARDIZEM CD) 240 mg ER capsule Take 1 Cap by mouth daily. Qty: 30 Cap, Refills: 11         CONTINUE these medications which have NOT CHANGED    Details   busPIRone (BUSPAR) 10 mg tablet Take 1 Tab by mouth three (3) times daily (with meals). Qty: 90 Tab, Refills: 0      atorvastatin (LIPITOR) 80 mg tablet Take 1 Tab by mouth nightly.   Qty: 30 Tab, Refills: 11 albuterol (PROVENTIL HFA, VENTOLIN HFA, PROAIR HFA) 90 mcg/actuation inhaler Take 2 Puffs by inhalation every six (6) hours as needed for Wheezing. Qty: 1 Inhaler, Refills: 1      rivaroxaban (XARELTO) 15 mg tab tablet Take 1 Tab by mouth daily (with dinner). Qty: 30 Tab, Refills: 11      clopidogrel (PLAVIX) 75 mg tab Take 1 Tab by mouth daily. Qty: 30 Tab, Refills: 11      nitroglycerin (NITROSTAT) 0.4 mg SL tablet 1 Tab by SubLINGual route every five (5) minutes as needed for Chest Pain. Qty: 1 Bottle, Refills: 11         STOP taking these medications       metoprolol succinate (TOPROL-XL) 50 mg XL tablet Comments:   Reason for Stopping:                 Signed:  Petra Olivarez NP  6/1/2018  7:51 AM    I have personally seen and examined patient and agree with above assessment.   Please see my progress note for more details    Tana Fonseca MD  6/1/2018  5:16 PM

## 2018-06-06 ENCOUNTER — PATIENT OUTREACH (OUTPATIENT)
Dept: CASE MANAGEMENT | Age: 53
End: 2018-06-06